# Patient Record
Sex: FEMALE | HISPANIC OR LATINO | Employment: FULL TIME | URBAN - METROPOLITAN AREA
[De-identification: names, ages, dates, MRNs, and addresses within clinical notes are randomized per-mention and may not be internally consistent; named-entity substitution may affect disease eponyms.]

---
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
## 2022-01-11 NOTE — PROGRESS NOTES
"  [facility]     Date of visit: 1/13/2022      Assessment:   Alpa Whitney is a 21 year old female with a past medical history significant for TBI and depression who presents with complaints of widespread pain.      1. Widespread pain- etiology likely fibromyalgia.  2. Mental Health - the patient's mental health concerns, specifically situational depression, affect her experience of pain and contribute to her clinically significant distress.     Visit Diagnoses:  1. Myofascial pain    2. Fibromyalgia        Plan:     1.  Pain Physical Therapy:                YES  It sounds like physical therapy is going well, Aviva is increasing her physical activity level and strength. I encouraged she continue visits with Shaunna Mendoza PT when able.               2.  Pain Psychologist to address relaxation, behavioral change, coping style, and other factors important to improvement.               NO  Continue to work with Marilee with [facility] on a regular basis.               3.  Medication Management:                           1. Addition of amitriptyline has been helpful for sleep and possibly for pain. We will increase further. Increase amitriptyline to 30mg at bedtime, monitor benefit. We can increase further as needed.               4.  Potential procedures: trigger point injections completed today. Excellent benefit afterwards, \"I feel like a ton of bricks are off my shoulder.\"               5.  Follow up with SUMIT Villeda CNP in 4-6 weeks.       Christiana ARANA CNP  [facility]     -------------------------------------------------    Subjective:    Chief complaint:   Chief Complaint   Patient presents with     Pain       Interval history:  Alpa Whitney is a 21 year old female last seen on 12/2/2021.  she is seen in follow up.     Recommendations/plan at the last visit included:   1.  Pain Physical Therapy:                " "YES  Pain physical therapy was somewhat helpful but she was only able to have two sessions. She will start physical therapy with Shaunna Mendoza PT when able. I recommended she have visits on a regular basis.               2.  Pain Psychologist to address relaxation, behavioral change, coping style, and other factors important to improvement.               NO  Continue to work with Marilee with [facility] on a regular basis.               3.  Medication Management:                           1. Unfortunately Aviva reports no pain benefit from Cymbalta 40mg after 6-8 weeks at this dose. She would like to taper off. She will decrease Cymbalta to 20mg daily for 7 days, then stop.    2. Sleep is a current significant problem. We discussed the benefits of amitriptyline and she is interested in starting. Start amitriptyline 10mg at bedtime. After 10 days, can increase to 20mg at bedtime. Monitor pain and sleep benefit.               4.  Potential procedures: significant myofascial pain. Trigger point injections ordered today. They will call to schedule.               5.  Follow up with SUMIT Villeda CNP 2 weeks after injections.     Since her last visit, Alpa DOMI Whitney reports:  -Her pain is better than it was at last visit.   -She attributes this to be due to amitriptyline and physical therapy.   -She started amitriptyline at bedtime, has been taking 20mg for several weeks. She states she has been falling asleep and staying asleep much better, \"I feel refreshed.\" She also thinks it has helped with pain some as well. She interested in dose increase.   -She tapered off of Cymbalta without any issue.   -She had two visits with Shaunna Flor PT. States, \"I think it will be helpful, its helping me be stronger.\" She is able to practice exercises and stretches at home, is able to walk longer. She cancelled an appointment because of a GI bug.   -She didn't have trigger point injections as " "she had work that day and couldn't get it off. She is still interested.     Pain Information:   Pain rating: averages 5/10 on a 0-10 scale.      Current pain medications:    Amitriptyline 20mg at bedtime- [facility] for pain and sleep   Ibuprofen 200mg prn- [facility], [place], takes for severe pain    Current MME: 0    Review of [place] Prescription Monitoring Program (): No concern for abuse or misuse of controlled medications based on this report.     Annual Controlled Substance Agreement/UDS due date: NA    Past pain treatments:  1. Previous Pain Relevant Medications:              Opiates: Tylenol with codeine- [facility]              NSAIDS: ibuprofen- [facility], Toradol- ?              Muscle Relaxants: Flexeril- [facility], , sedating, took at bedtime, Robaxin- ?, [place], sedating              Anti-migraine mediations: no              Anti-depressants:  Zoloft- [facility], \"I didn't want to keep going up,\" Cymbalta (40mg 6-8 weeks)- [facility]               Sleep aids:no              Anxiolytics: no              Neuropathics: no                       Topicals: no              Other medications not covered above: Tylenol- [facility]     2. Physical Therapy:   pain physical therapy x2 sessions- [facility]  concussion physical therapy- [facility]  3. Pain Psychology:  has a therapist- Marilee with [facility]  4. Surgery: no  5. Injections: no  6. Chiropractic: yes on a regular basis- [facility]  7. Acupuncture: no  8. TENS Unit: no       Medications:  Current Outpatient Medications   Medication Sig Dispense Refill     amitriptyline (ELAVIL) 10 MG tablet Take 3 tablets (30 mg) by mouth At Bedtime 90 tablet 1     cetirizine (ZYRTEC) 10 MG tablet Take 1 tablet (10 mg) by mouth daily 100 tablet 11     ergocalciferol (VITAMIN D2) 400 units (10 mcg) TABS tablet Take 2 tablets (800 Units) by mouth daily 60 tablet 3     etonogestrel (NEXPLANON) 68 MG IMPL 1 each (68 mg) by Subdermal route once       ibuprofen (ADVIL/MOTRIN) 200 MG capsule Take 200 mg by mouth every 4 hours as needed for fever  "      potassium aminobenzoate 500 MG CAPS capsule          Medical History: any changes in medical history since they were last seen? No    Review of Systems: A 10-point review of systems was negative, with the exception of chronic pain issues.      Objective:    Physical Exam:  Blood pressure 117/81, pulse 82, SpO2 99 %, not currently breastfeeding.  Constitutional: Well developed, well nourished, appears stated age.  Gait: Normal  HEENT: Head atraumatic, normocephalic. Eyes without conjunctival injection or jaundice. Oropharynx clear. Neck supple. No obvious neck masses.  Skin: No rash, lesions, or petechiae of exposed skin.   Psychiatric/mental status: Alert, without lethargy or stupor. Speech fluent. Appropriate affect. Mood normal. Able to follow commands without difficulty.     [facility] - Procedure Note    Date of Visit: 1/13/2022    Pre procedure Diagnosis: myofascial pain/myositis 60.9   Post procedure Diagnosis: Same  Procedure performed: trigger point injections  Complications: none  Operators: Christiana ARANA CNP    /81   Pulse 82   SpO2 99%     Indications:   Alpa Whitney is a 21 year old female with a history of neck and shoulder pain.  Exam shows myofascial pain of the muscle groups listed below and they have tried conservative treatment including PT and medications.    Options/alternatives, benefits and risks were discussed with the patient including bleeding, infection, tissue trauma and pnuemothorax.  Questions were answered to her satisfaction and she agrees to proceed. Voluntary informed consent was obtained and signed.     Vitals allergies and medications were reviewed.    This is a first time trigger point injection.     Procedure:  After getting informed consent, a Pause for the Cause was performed.    Trigger points were identified by patient, and marked when appropriate.  The area was prepped with Chloroprep.    Using clean technique,  injections were completed using a 25G, 1.5 inch needle.  After negative aspiration, injection was completed.  A total of 5 locations were injected.  When possible, tissue was retracted from the chest wall to avoid lung injury.    Muscle groups injected:  Bilateral trapezius and left splenius capitis, levator scapulae.      Injection solution contained:  9ml of 0.5% bupivacaine.    Hemostasis was achieved, the area was cleaned, and bandaids were placed when appropriate.  The patient tolerated the procedure well.  Respirations were equal and unlabored.       Follow-up includes:   -f/u with the referring provider  -repeat as needed      Christiana ARANA [facility]       BILLING TIME DOCUMENTATION:   The total TIME spent on this patient on the date of the encounter/appointment was 20 minutes.      TOTAL TIME includes:   Time spent preparing to see the patient (reviewing records and tests)   Time spent face to face (or over the phone) with the patient   Time spent ordering tests, medications, procedures and referrals   Time spent Referring and communicating with other healthcare professionals   Time spent documenting clinical information in Epic

## 2022-01-13 ENCOUNTER — OFFICE VISIT (OUTPATIENT)
Dept: PALLIATIVE MEDICINE | Facility: CLINIC | Age: 22
End: 2022-01-13
Payer: COMMERCIAL

## 2022-01-13 VITALS — HEART RATE: 82 BPM | DIASTOLIC BLOOD PRESSURE: 81 MMHG | SYSTOLIC BLOOD PRESSURE: 117 MMHG | OXYGEN SATURATION: 99 %

## 2022-01-13 DIAGNOSIS — M79.7 FIBROMYALGIA: ICD-10-CM

## 2022-01-13 DIAGNOSIS — M79.18 MYOFASCIAL PAIN: Primary | ICD-10-CM

## 2022-01-13 PROCEDURE — 20553 NJX 1/MLT TRIGGER POINTS 3/>: CPT | Performed by: NURSE PRACTITIONER

## 2022-01-13 PROCEDURE — 99213 OFFICE O/P EST LOW 20 MIN: CPT | Mod: 25 | Performed by: NURSE PRACTITIONER

## 2022-01-13 RX ORDER — BUPIVACAINE HYDROCHLORIDE 5 MG/ML
9 INJECTION, SOLUTION EPIDURAL; INTRACAUDAL ONCE
Status: COMPLETED | OUTPATIENT
Start: 2022-01-13 | End: 2022-01-13

## 2022-01-13 RX ORDER — AMITRIPTYLINE HYDROCHLORIDE 10 MG/1
30 TABLET ORAL AT BEDTIME
Qty: 90 TABLET | Refills: 1 | Status: SHIPPED | OUTPATIENT
Start: 2022-01-13 | End: 2022-04-01

## 2022-01-13 RX ADMIN — BUPIVACAINE HYDROCHLORIDE 45 MG: 5 INJECTION, SOLUTION EPIDURAL; INTRACAUDAL at 11:31

## 2022-01-13 ASSESSMENT — PAIN SCALES - GENERAL: PAINLEVEL: MODERATE PAIN (5)

## 2022-01-13 NOTE — PATIENT INSTRUCTIONS
1.  Pain Physical Therapy:                YES  Continue visits with Shaunna Mendoza PT when able.               2.  Pain Psychologist to address relaxation, behavioral change, coping style, and other factors important to improvement.               NO  Continue to work with Marilee with [facility] on a regular basis.               3.  Medication Management:                           1. Increase amitriptyline to 30mg at bedtime, monitor benefit. We can increase further as needed.               4.  Potential procedures: trigger point injections completed today.               5.  Follow up with SUMIT Villeda CNP in 4-6 weeks.     ----------------------------------------------------------------  Clinic Number:  [phone]     Call with any questions about your care and for scheduling assistance.     Calls are returned Monday through Friday between 8 AM and 4:30 PM. We usually get back to you within 2 business days depending on the issue/request.    If we are prescribing your medications:    For opioid medication refills, call the clinic or send a [employer] message 7 days in advance.  Please include:    Name of requested medication    Name of the pharmacy.    For non-opioid medications, call your pharmacy directly to request a refill. Please allow 3-4 days to be processed.     Per [place] State Law:    All controlled substance prescriptions must be filled within 30 days of being written.      For those controlled substances allowing refills, pickup must occur within 30 days of last fill.      We believe regular attendance is key to your success in our program!      Any time you are unable to keep your appointment we ask that you call us at least 24 hours in advance to cancel.This will allow us to offer the appointment time to another patient.     Multiple missed appointments may lead to dismissal from the clinic.    [facility]  Post Procedure Instructions    Today you had:   trigger point injections         Medications used:  lidocaine   bupivicaine   kenolog   dexamethasone          Go to the emergency room if you develop any shortness of breath    Monitor the injection sites for signs and symptoms of infection-fever, chills, redness, swelling, warmth, or drainage to areas.    You may have soreness at injection sites for up to 24 hours.    You may apply ice to the painful areas to help minimize the discomfort of the needle pokes.    Do not apply heat to sites for at least 12 hours.    You may use anti-inflammatory medications or Tylenol for pain control if necessary    Pain Clinic phone number during work hours (Monday through Friday 8 am-4:30 pm) at [phone] or the Provider Line after hours at [phone]:

## 2022-02-22 NOTE — PROGRESS NOTES
[facility]    Outpatient Occupational Therapy Discharge Note  Patient: Alpa Whitney  : 2000    Beginning/End Dates of Reporting Period:  20 to 12/10/20    Referring Provider: Linda Galvin MD     Therapy Diagnosis: Cervicalgia M54.2  - Primary; Post-concussion headache G44.309     Client Self Report: Pt c/o significant fatigue and headache today. Has not been sleeping well since her concussion and headaches have been constant. Is not currently working due to COVID and has not been completing school work due to concussion symptoms. Forgot to track symptoms between eval and today's visit.    Objective Measurements:   N/A      Goals:     Goal Identifier symptom management   Goal Description Patient will identify and independently demonstrate 3 strategies (lifestyle changes, EC, etc.) to decrease her post-concussion symptoms for increased independence during ADL/IADLs (school, work and home tasks), and report a score of 15 or less on 2 consecutive visits on the concussion symptom assessment score.   Target Date 21   Date Met      Progress (detail required for progress note): Goal not met due to limited number of treatment sessions.  Thus far education was completed in: sleep hygiene     Goal Identifier visual skills   Goal Description Patient will identify and utilize 3 strategies and/or AE re: vision to decrease her light sensitivity and increase her peripersonal and extrapersonal scanning speed, accuracy and tolerance for increased independence during ADL/IADLs   Target Date 21   Date Met      Progress (detail required for progress note): Goal not met due to limited number of treatment sessions.  Thus far education was completed in: filters (light and extra dark plum, standard size) - issued resources.     Goal Identifier memory skills   Goal Description Patient  will demonstrate improved memory skills by completing moderately complex short-term memory tasks in occupational therapy with 85% accuracy using compensatory strategies for increased safety and independence with ADL/IADLS at home, work and in the community.   Target Date 02/11/21   Date Met      Progress (detail required for progress note): Goal not addressed due to limited number of treatment sessions.     Plan:  Discharge from therapy.    Discharge:    Reason for Discharge: Patient has failed to schedule further appointments.  She only showed for 1 visit after the evaluation despite recommendations to continue therapy.    Equipment Issued: N/A    Discharge Plan: Patient to continue home program.

## 2022-04-01 DIAGNOSIS — T78.40XA ALLERGY, INITIAL ENCOUNTER: ICD-10-CM

## 2022-04-01 DIAGNOSIS — M79.7 FIBROMYALGIA: ICD-10-CM

## 2022-04-01 RX ORDER — AMITRIPTYLINE HYDROCHLORIDE 10 MG/1
30 TABLET ORAL AT BEDTIME
Qty: 90 TABLET | Refills: 1 | Status: SHIPPED | OUTPATIENT
Start: 2022-04-01 | End: 2022-08-24

## 2022-04-01 NOTE — TELEPHONE ENCOUNTER
Received fax request from [facility] requesting refill(s) for amitriptyline (ELAVIL) 10 MG tablet    Last refilled on 02/27/22    Pt last seen on 01/13/22  Next appt scheduled for : none    Will facilitate refill.

## 2022-04-01 NOTE — TELEPHONE ENCOUNTER
Routing refill request to provider for review/approval because:  Patient needs to be seen because:  Due for depression visit    Last office visit was 8/27/21     Myc message sent to patient advising scheduling an appointment. Was scheduled with a pediatrician on 2/22/22 and had that appointment canceled.  Has not rescheduled yet.

## 2022-04-03 RX ORDER — CETIRIZINE HYDROCHLORIDE 10 MG/1
TABLET ORAL
Qty: 30 TABLET | Refills: 39 | Status: SHIPPED | OUTPATIENT
Start: 2022-04-03

## 2022-06-04 NOTE — PROGRESS NOTES
Alpa Whitney was seen 2 times for evaluation and treatment.  Patient did not return for further treatment and current status is unknown.  Due to short treatment duration, no objective or functional changes were made.  Please see goal flow sheet from episode noted date below and initial evaluation for further information.    No linked episodes

## 2022-08-24 ENCOUNTER — VIRTUAL VISIT (OUTPATIENT)
Dept: INTERNAL MEDICINE | Facility: CLINIC | Age: 22
End: 2022-08-24
Payer: COMMERCIAL

## 2022-08-24 DIAGNOSIS — J06.9 UPPER RESPIRATORY TRACT INFECTION, UNSPECIFIED TYPE: Primary | ICD-10-CM

## 2022-08-24 PROCEDURE — 99213 OFFICE O/P EST LOW 20 MIN: CPT | Mod: 95 | Performed by: INTERNAL MEDICINE

## 2022-08-24 RX ORDER — AZITHROMYCIN 250 MG/1
TABLET, FILM COATED ORAL
Qty: 6 TABLET | Refills: 0 | Status: SHIPPED | OUTPATIENT
Start: 2022-08-24 | End: 2022-11-15

## 2022-08-24 NOTE — PROGRESS NOTES
Aviva is a 22 year old who is being evaluated via a billable video visit.      How would you like to obtain your AVS? MyChart  If the video visit is dropped, the invitation should be resent by: Text to cell phone: [phone]  Will anyone else be joining your video visit? No        Assessment & Plan     Upper respiratory tract infection, unspecified type  Likely viral URI.  Discussed various strategies for symptom control.  Wrote prescription for Zithromax, to be taken starting on Saturday if symptoms have not improved.  Patient requested release from work for the rest of this week, which seems reasonable.  - azithromycin (ZITHROMAX) 250 MG tablet; Two tablets first day, then one tablet daily for four days.             See Patient Instructions    Return in about 2 weeks (around 9/7/2022) for recheck, if symptoms fail to improve.    Jacques Jiang MD  [facility]        Ernst Chicas is a 22 year old, presenting for the following health issues:    Pharyngitis and Cough          Acute Illness    Onset/Duration: 5 days   Symptoms:  Fever: YES  Chills/Sweats: No  Headache (location?): YES  Sinus Pressure: No  Conjunctivitis:  No  Ear Pain: no  Rhinorrhea: YES  Congestion: YES  Sore Throat: YES  Cough: YES  Wheeze: No  Decreased Appetite: No  Nausea: YES  Vomiting: No  Diarrhea: No  Dysuria/Freq.: No  Dysuria or Hematuria: No  Fatigue/Achiness: YES  Sick/Strep Exposure: No  Therapies tried and outcome: cough syrup         Review of Systems   Constitutional, HEENT, cardiovascular, pulmonary, gi and gu systems are negative, except as otherwise noted.      Objective    Vitals - Patient Reported  Weight (Patient Reported): 52.2 kg (115 lb)      Vitals:  No vitals were obtained today due to virtual visit.    Physical Exam   GENERAL: Healthy, alert and no distress  EYES: Eyes grossly normal to inspection.  No discharge or erythema, or obvious scleral/conjunctival  abnormalities.  RESP: No audible wheeze, occasional coughing, sinus congestion  SKIN: Visible skin clear. No significant rash, abnormal pigmentation or lesions.  NEURO: Cranial nerves grossly intact.  Mentation and speech appropriate for age.  PSYCH: Mentation appears normal, affect normal/bright, judgement and insight intact, normal speech and appearance well-groomed.                Video-Visit Details    Video Start Time: 5:31 PM    Type of service:  Video Visit    Video End Time:5:36 PM    Originating Location (pt. Location): Home    Distant Location (provider location):  [facility]     Platform used for Video Visit: [employer]    .  Reyes.

## 2022-11-11 ASSESSMENT — ENCOUNTER SYMPTOMS
BREAST MASS: 0
CHILLS: 1
NAUSEA: 1
FEVER: 1
DIZZINESS: 1
PALPITATIONS: 0
HEMATOCHEZIA: 0
ARTHRALGIAS: 1
MYALGIAS: 1
JOINT SWELLING: 0
CONSTIPATION: 0
ABDOMINAL PAIN: 0
SORE THROAT: 1
EYE PAIN: 0
NERVOUS/ANXIOUS: 1
DIARRHEA: 0
DYSURIA: 0
PARESTHESIAS: 0
HEARTBURN: 0
FREQUENCY: 1
HEADACHES: 1
WEAKNESS: 1
SHORTNESS OF BREATH: 0
HEMATURIA: 0

## 2022-11-15 ENCOUNTER — OFFICE VISIT (OUTPATIENT)
Dept: FAMILY MEDICINE | Facility: CLINIC | Age: 22
End: 2022-11-15
Payer: COMMERCIAL

## 2022-11-15 VITALS
WEIGHT: 117.8 LBS | HEART RATE: 106 BPM | SYSTOLIC BLOOD PRESSURE: 112 MMHG | RESPIRATION RATE: 18 BRPM | TEMPERATURE: 98 F | OXYGEN SATURATION: 98 % | DIASTOLIC BLOOD PRESSURE: 64 MMHG | BODY MASS INDEX: 23.01 KG/M2

## 2022-11-15 DIAGNOSIS — Z11.59 NEED FOR HEPATITIS C SCREENING TEST: ICD-10-CM

## 2022-11-15 DIAGNOSIS — Z11.4 SCREENING FOR HIV (HUMAN IMMUNODEFICIENCY VIRUS): ICD-10-CM

## 2022-11-15 DIAGNOSIS — Z23 NEED FOR VACCINATION: ICD-10-CM

## 2022-11-15 DIAGNOSIS — Z00.00 ROUTINE GENERAL MEDICAL EXAMINATION AT A HEALTH CARE FACILITY: Primary | ICD-10-CM

## 2022-11-15 DIAGNOSIS — Z11.3 SCREENING FOR STDS (SEXUALLY TRANSMITTED DISEASES): ICD-10-CM

## 2022-11-15 LAB
BASOPHILS # BLD AUTO: 0 10E3/UL (ref 0–0.2)
BASOPHILS NFR BLD AUTO: 0 %
DEPRECATED CALCIDIOL+CALCIFEROL SERPL-MC: 11 UG/L (ref 20–75)
EOSINOPHIL # BLD AUTO: 0.1 10E3/UL (ref 0–0.7)
EOSINOPHIL NFR BLD AUTO: 1 %
ERYTHROCYTE [DISTWIDTH] IN BLOOD BY AUTOMATED COUNT: 14 % (ref 10–15)
HCT VFR BLD AUTO: 42.4 % (ref 35–47)
HCV AB SERPL QL IA: NONREACTIVE
HGB BLD-MCNC: 13.8 G/DL (ref 11.7–15.7)
HIV 1+2 AB+HIV1 P24 AG SERPL QL IA: NONREACTIVE
LYMPHOCYTES # BLD AUTO: 1.8 10E3/UL (ref 0.8–5.3)
LYMPHOCYTES NFR BLD AUTO: 17 %
MCH RBC QN AUTO: 29 PG (ref 26.5–33)
MCHC RBC AUTO-ENTMCNC: 32.5 G/DL (ref 31.5–36.5)
MCV RBC AUTO: 89 FL (ref 78–100)
MONOCYTES # BLD AUTO: 0.5 10E3/UL (ref 0–1.3)
MONOCYTES NFR BLD AUTO: 5 %
NEUTROPHILS # BLD AUTO: 8.2 10E3/UL (ref 1.6–8.3)
NEUTROPHILS NFR BLD AUTO: 77 %
PLATELET # BLD AUTO: 301 10E3/UL (ref 150–450)
RBC # BLD AUTO: 4.76 10E6/UL (ref 3.8–5.2)
WBC # BLD AUTO: 10.6 10E3/UL (ref 4–11)

## 2022-11-15 PROCEDURE — 87491 CHLMYD TRACH DNA AMP PROBE: CPT | Performed by: STUDENT IN AN ORGANIZED HEALTH CARE EDUCATION/TRAINING PROGRAM

## 2022-11-15 PROCEDURE — 86803 HEPATITIS C AB TEST: CPT | Performed by: STUDENT IN AN ORGANIZED HEALTH CARE EDUCATION/TRAINING PROGRAM

## 2022-11-15 PROCEDURE — 99395 PREV VISIT EST AGE 18-39: CPT | Mod: 25 | Performed by: STUDENT IN AN ORGANIZED HEALTH CARE EDUCATION/TRAINING PROGRAM

## 2022-11-15 PROCEDURE — 0124A COVID-19,PF,PFIZER BOOSTER BIVALENT: CPT | Performed by: STUDENT IN AN ORGANIZED HEALTH CARE EDUCATION/TRAINING PROGRAM

## 2022-11-15 PROCEDURE — 91312 COVID-19,PF,PFIZER BOOSTER BIVALENT: CPT | Performed by: STUDENT IN AN ORGANIZED HEALTH CARE EDUCATION/TRAINING PROGRAM

## 2022-11-15 PROCEDURE — 87591 N.GONORRHOEAE DNA AMP PROB: CPT | Performed by: STUDENT IN AN ORGANIZED HEALTH CARE EDUCATION/TRAINING PROGRAM

## 2022-11-15 PROCEDURE — 90471 IMMUNIZATION ADMIN: CPT | Performed by: STUDENT IN AN ORGANIZED HEALTH CARE EDUCATION/TRAINING PROGRAM

## 2022-11-15 PROCEDURE — 87389 HIV-1 AG W/HIV-1&-2 AB AG IA: CPT | Performed by: STUDENT IN AN ORGANIZED HEALTH CARE EDUCATION/TRAINING PROGRAM

## 2022-11-15 PROCEDURE — 36415 COLL VENOUS BLD VENIPUNCTURE: CPT | Performed by: STUDENT IN AN ORGANIZED HEALTH CARE EDUCATION/TRAINING PROGRAM

## 2022-11-15 PROCEDURE — 82306 VITAMIN D 25 HYDROXY: CPT | Performed by: STUDENT IN AN ORGANIZED HEALTH CARE EDUCATION/TRAINING PROGRAM

## 2022-11-15 PROCEDURE — 90686 IIV4 VACC NO PRSV 0.5 ML IM: CPT | Performed by: STUDENT IN AN ORGANIZED HEALTH CARE EDUCATION/TRAINING PROGRAM

## 2022-11-15 PROCEDURE — 85025 COMPLETE CBC W/AUTO DIFF WBC: CPT | Performed by: STUDENT IN AN ORGANIZED HEALTH CARE EDUCATION/TRAINING PROGRAM

## 2022-11-15 ASSESSMENT — ENCOUNTER SYMPTOMS
SORE THROAT: 1
DIARRHEA: 0
FREQUENCY: 1
HEMATURIA: 0
ARTHRALGIAS: 1
CHILLS: 1
HEADACHES: 1
JOINT SWELLING: 0
WEAKNESS: 1
PALPITATIONS: 0
EYE PAIN: 0
SHORTNESS OF BREATH: 0
HEMATOCHEZIA: 0
NERVOUS/ANXIOUS: 1
BREAST MASS: 0
MYALGIAS: 1
ABDOMINAL PAIN: 0
DIZZINESS: 1
HEARTBURN: 0
NAUSEA: 1
CONSTIPATION: 0
FEVER: 1
PARESTHESIAS: 0
DYSURIA: 0

## 2022-11-15 ASSESSMENT — PATIENT HEALTH QUESTIONNAIRE - PHQ9
SUM OF ALL RESPONSES TO PHQ QUESTIONS 1-9: 7
10. IF YOU CHECKED OFF ANY PROBLEMS, HOW DIFFICULT HAVE THESE PROBLEMS MADE IT FOR YOU TO DO YOUR WORK, TAKE CARE OF THINGS AT HOME, OR GET ALONG WITH OTHER PEOPLE: SOMEWHAT DIFFICULT
SUM OF ALL RESPONSES TO PHQ QUESTIONS 1-9: 7

## 2022-11-15 NOTE — PROGRESS NOTES
SUBJECTIVE:   CC: Aviva is an 22 year old who presents for preventive health visit.       Patient has been advised of split billing requirements and indicates understanding: Yes  Healthy Habits:     Getting at least 3 servings of Calcium per day:  Yes    Bi-annual eye exam:  NO    Dental care twice a year:  Yes    Sleep apnea or symptoms of sleep apnea:  None    Diet:  Regular (no restrictions)    Frequency of exercise:  2-3 days/week    Duration of exercise:  15-30 minutes    Taking medications regularly:  Yes    Medication side effects:  Lightheadedness    PHQ-2 Total Score: 1    Additional concerns today:  Yes              Today's PHQ-2 Score:   PHQ-2 ( 1999 Pfizer) 11/11/2022   Q1: Little interest or pleasure in doing things 1   Q2: Feeling down, depressed or hopeless 0   PHQ-2 Score 1   PHQ-2 Total Score (12-17 Years)- Positive if 3 or more points; Administer PHQ-A if positive -   Q1: Little interest or pleasure in doing things Several days   Q2: Feeling down, depressed or hopeless Not at all   PHQ-2 Score 1       Abuse: Current or Past (Physical, Sexual or Emotional) - No  Do you feel safe in your environment? Yes        Social History     Tobacco Use     Smoking status: Never     Smokeless tobacco: Never   Substance Use Topics     Alcohol use: No     Alcohol/week: 0.0 standard drinks         Alcohol Use 11/11/2022   Prescreen: >3 drinks/day or >7 drinks/week? No       Reviewed orders with patient.  Reviewed health maintenance and updated orders accordingly - Yes  Lab work is in process  Labs reviewed in EPIC    Breast Cancer Screening:        History of abnormal Pap smear: NO - under age 21, PAP not appropriate for age     Reviewed and updated as needed this visit by clinical staff   Tobacco  Allergies  Meds   Med Hx  Surg Hx  Fam Hx  Soc Hx        Reviewed and updated as needed this visit by Provider                     Review of Systems   Constitutional: Positive for chills and fever.   HENT:  Positive for congestion, ear pain and sore throat. Negative for hearing loss.    Eyes: Positive for visual disturbance. Negative for pain.   Respiratory: Negative for shortness of breath.    Cardiovascular: Negative for chest pain, palpitations and peripheral edema.   Gastrointestinal: Positive for nausea. Negative for abdominal pain, constipation, diarrhea, heartburn and hematochezia.   Breasts:  Negative for tenderness, breast mass and discharge.   Genitourinary: Positive for frequency. Negative for dysuria, genital sores, hematuria, pelvic pain, urgency, vaginal bleeding and vaginal discharge.   Musculoskeletal: Positive for arthralgias and myalgias. Negative for joint swelling.   Skin: Negative for rash.   Neurological: Positive for dizziness, weakness and headaches. Negative for paresthesias.   Psychiatric/Behavioral: The patient is nervous/anxious.           OBJECTIVE:   /64   Pulse 106   Temp 98  F (36.7  C) (Oral)   Resp 18   Wt 53.4 kg (117 lb 12.8 oz)   SpO2 98%   BMI 23.01 kg/m    Physical Exam  GENERAL: healthy, alert and no distress  EYES: Eyes grossly normal to inspection, PERRL and conjunctivae and sclerae normal  HENT: ear canals and TM's normal, nose and mouth without ulcers or lesions  NECK: no adenopathy, no asymmetry, masses, or scars and thyroid normal to palpation  RESP: lungs clear to auscultation - no rales, rhonchi or wheezes  CV: regular rate and rhythm, normal S1 S2, no S3 or S4, no murmur, click or rub, no peripheral edema and peripheral pulses strong  ABDOMEN: soft, nontender, no hepatosplenomegaly, no masses and bowel sounds normal  MS: no gross musculoskeletal defects noted, no edema  PSYCH: mentation appears normal, affect normal/bright    Diagnostic Test Results:  Labs reviewed in Epic  Results for orders placed or performed in visit on 11/15/22   CBC with platelets and differential     Status: None (In process)    Narrative    The following orders were created for panel  order CBC with platelets and differential.  Procedure                               Abnormality         Status                     ---------                               -----------         ------                     CBC with platelets and d...[[id number]]                      In process                   Please view results for these tests on the individual orders.       ASSESSMENT/PLAN:   (Z00.00) Routine general medical examination at a health care facility  (primary encounter diagnosis)  Comment: Stable   Plan: REVIEW OF HEALTH MAINTENANCE PROTOCOL ORDERS,         Vitamin D Deficiency, CBC with platelets and         differential        Pending labs      (Z11.4) Screening for HIV (human immunodeficiency virus)  Comment: Stable  Plan: HIV Antigen Antibody Combo        Pending labs      (Z11.59) Need for hepatitis C screening test  Comment: Stable  Plan: Hepatitis C Screen Reflex to HCV RNA Quant and         Genotype        Pending labs      (Z11.3) Screening for STDs (sexually transmitted diseases)  Comment: Stable  Plan: NEISSERIA GONORRHOEA PCR, CHLAMYDIA TRACHOMATIS        PCR        Pending labs      (Z23) Need for vaccination  Comment: Stable  Plan: INFLUENZA VACCINE IM > 6 MONTHS VALENT IIV4         (AFLURIA/FLUZONE), COVID-19,PF,PFIZER BOOSTER         BIVALENT (12+YRS)        Pt received Covid booster and flu shot today vaccine(s) today in clinic        Patient has been advised of split billing requirements and indicates understanding: Yes      COUNSELING:  Reviewed preventive health counseling, as reflected in patient instructions       Regular exercise       Healthy diet/nutrition       Immunizations    Vaccinated for: Influenza +COVID-19 booster              Advance Care Planning    Estimated body mass index is 23.01 kg/m  as calculated from the following:    Height as of 8/27/21: 1.524 m (5').    Weight as of this encounter: 53.4 kg (117 lb 12.8 oz).        She reports that she has never smoked. She has  never used smokeless tobacco.      Counseling Resources:  ATP IV Guidelines  Pooled Cohorts Equation Calculator  Breast Cancer Risk Calculator  BRCA-Related Cancer Risk Assessment: FHS-7 Tool  FRAX Risk Assessment  ICSI Preventive Guidelines  Dietary Guidelines for Americans, 2010  USDA's MyPlate  ASA Prophylaxis  Lung CA Screening    MABEL DOTSON MD  [facility]  Answers for HPI/ROS submitted by the patient on 11/15/2022  If you checked off any problems, how difficult have these problems made it for you to do your work, take care of things at home, or get along with other people?: Somewhat difficult  PHQ9 TOTAL SCORE: 7

## 2022-11-15 NOTE — PATIENT INSTRUCTIONS
Thank you for choosing [facility] :)    Have a wonderful day     -Dr. Yessenia Barnes     Preventive Health Recommendations  Female Ages 18 to 20     Yearly exam:   See your health care provider every year in order to  Review health changes.   Discuss preventive care.    Review your medicines if your doctor has prescribed any.    You should be tested each year for STDs (sexually transmitted diseases).     After age 20, talk to your provider about how often you should have cholesterol testing.    If you are at risk for diabetes, you should have a diabetes test (fasting glucose).     Shots:   Get a flu shot each year.   Get a tetanus shot every 10 years.   Consider getting the shot (vaccine) that prevents cervical cancer (Gardasil).    Nutrition:   Eat at least 5 servings of fruits and vegetables each day.  Eat whole-grain bread, whole-wheat pasta and brown rice instead of white grains and rice.  Get adequate Calcium and Vitamin D.     Lifestyle  Exercise at least 150 minutes a week each week (30 minutes a day, 5 days a week). This will help you control your weight and prevent disease.  No smoking.   Wear sunscreen to prevent skin cancer.  See your dentist every six months for an exam and cleaning.

## 2022-11-16 DIAGNOSIS — E55.9 VITAMIN D DEFICIENCY: Primary | ICD-10-CM

## 2022-11-16 LAB
C TRACH DNA SPEC QL NAA+PROBE: NEGATIVE
N GONORRHOEA DNA SPEC QL NAA+PROBE: NEGATIVE

## 2022-11-16 RX ORDER — ERGOCALCIFEROL 1.25 MG/1
50000 CAPSULE, LIQUID FILLED ORAL WEEKLY
Qty: 12 CAPSULE | Refills: 3 | Status: SHIPPED | OUTPATIENT
Start: 2022-11-16 | End: 2023-02-15

## 2023-01-24 ENCOUNTER — VIRTUAL VISIT (OUTPATIENT)
Dept: URGENT CARE | Facility: CLINIC | Age: 23
End: 2023-01-24
Payer: COMMERCIAL

## 2023-01-24 DIAGNOSIS — J02.9 ACUTE SORE THROAT: ICD-10-CM

## 2023-01-24 DIAGNOSIS — H10.33 ACUTE CONJUNCTIVITIS OF BOTH EYES, UNSPECIFIED ACUTE CONJUNCTIVITIS TYPE: Primary | ICD-10-CM

## 2023-01-24 PROCEDURE — 99213 OFFICE O/P EST LOW 20 MIN: CPT | Mod: 95

## 2023-01-24 RX ORDER — POLYMYXIN B SULFATE AND TRIMETHOPRIM 1; 10000 MG/ML; [USP'U]/ML
1-2 SOLUTION OPHTHALMIC EVERY 6 HOURS
Qty: 3 ML | Refills: 0 | Status: SHIPPED | OUTPATIENT
Start: 2023-01-24 | End: 2023-01-31

## 2023-01-24 NOTE — LETTER
January 24, 2023      Alpa Whitney  [address] [place] [facility] [address]        To Whom It May Concern:    Alpa Whitney  was seen on 1/24/23.  Please excuse her from work missed on January 24 and 25. 2023 due to illness.         Sincerely,    Yessenia Hakwins, CNP    [facility]

## 2023-01-24 NOTE — PATIENT INSTRUCTIONS
Make a lab only visit for your strep test. Once we have the results back we will contact you if treatment is indicated

## 2023-01-24 NOTE — PROGRESS NOTES
Aviva is a 22 year old who is being evaluated via a billable video visit.       Assessment & Plan     Acute conjunctivitis of both eyes, unspecified acute conjunctivitis type    - trimethoprim-polymyxin b (POLYTRIM) 90669-0.1 UNIT/ML-% ophthalmic solution; Place 1-2 drops into both eyes every 6 hours for 7 days    Acute sore throat    - Streptococcus A Rapid Screen w/Reflex to PCR; Future      15 minutes spent on the date of the encounter doing chart review, patient visit and documentation        See Patient Instructions    Return in about 1 week (around 1/31/2023), or if symptoms worsen or fail to improve.    Virtual Urgent Care  [facility]    Subjective   Aviva is a 22 year old presenting for the following health issues:  No chief complaint on file.      HPI     Acute Illness  Acute illness concerns: pink eye and sore throat  Onset/Duration: 1 week  Symptom:  Fever: No  Chills/Sweats: No  Headache (location?): No  Sinus Pressure: No  Conjunctivitis:  YES  Ear Pain: no  Rhinorrhea: No  Congestion: No  Sore Throat: YES  Cough: no  Wheeze: No  Decreased Appetite: YES  Nausea: YES  Vomiting: No  Diarrhea: No  Fatigue/Achiness: YES  Sick/Strep Exposure: No  Therapies tried and outcome: None  COVID test at home was negative    Review of Systems   Constitutional, HEENT, cardiovascular, pulmonary, gi and gu systems are negative, except as otherwise noted.      Objective           Vitals:  No vitals were obtained today due to virtual visit.    Physical Exam   GENERAL: Healthy, alert and no distress  RESP: No audible wheeze, cough, or visible cyanosis.  No visible retractions or increased work of breathing.              Video-Visit Details    Type of service:  Video Visit   Video Start Time: 1655  Video End Time:5:04 PM    Originating Location (pt. Location): Home  Distant Location (provider location):  Off-site  Platform used for Video Visit: [employer]

## 2023-01-25 ENCOUNTER — LAB (OUTPATIENT)
Dept: URGENT CARE | Facility: URGENT CARE | Age: 23
End: 2023-01-25
Attending: NURSE PRACTITIONER
Payer: COMMERCIAL

## 2023-01-25 DIAGNOSIS — J02.9 ACUTE SORE THROAT: ICD-10-CM

## 2023-01-25 DIAGNOSIS — J02.0 STREP THROAT: Primary | ICD-10-CM

## 2023-01-25 LAB
DEPRECATED S PYO AG THROAT QL EIA: NEGATIVE
GROUP A STREP BY PCR: DETECTED

## 2023-01-25 PROCEDURE — 87651 STREP A DNA AMP PROBE: CPT

## 2023-01-25 PROCEDURE — 99207 PR NO CHARGE LOS: CPT

## 2023-01-25 RX ORDER — AMOXICILLIN 500 MG/1
500 CAPSULE ORAL 2 TIMES DAILY
Qty: 20 CAPSULE | Refills: 0 | Status: SHIPPED | OUTPATIENT
Start: 2023-01-25 | End: 2023-02-04

## 2023-01-25 NOTE — PROGRESS NOTES
Strep test completed. Patient handout For Patients Who Have Been Tested for Covid-19 (Coronavirus) was given to the patient, which includes test result notification process.

## 2023-02-01 ENCOUNTER — E-VISIT (OUTPATIENT)
Dept: FAMILY MEDICINE | Facility: CLINIC | Age: 23
End: 2023-02-01
Payer: COMMERCIAL

## 2023-02-01 DIAGNOSIS — R51.9 ACUTE NONINTRACTABLE HEADACHE, UNSPECIFIED HEADACHE TYPE: Primary | ICD-10-CM

## 2023-02-01 PROCEDURE — 99421 OL DIG E/M SVC 5-10 MIN: CPT | Performed by: STUDENT IN AN ORGANIZED HEALTH CARE EDUCATION/TRAINING PROGRAM

## 2023-02-01 RX ORDER — ONDANSETRON 4 MG/1
4 TABLET, ORALLY DISINTEGRATING ORAL EVERY 6 HOURS PRN
Qty: 30 TABLET | Refills: 1 | Status: SHIPPED | OUTPATIENT
Start: 2023-02-01

## 2023-02-15 ENCOUNTER — OFFICE VISIT (OUTPATIENT)
Dept: FAMILY MEDICINE | Facility: CLINIC | Age: 23
End: 2023-02-15
Payer: COMMERCIAL

## 2023-02-15 VITALS
HEART RATE: 101 BPM | HEIGHT: 60 IN | TEMPERATURE: 97.9 F | DIASTOLIC BLOOD PRESSURE: 74 MMHG | BODY MASS INDEX: 23.44 KG/M2 | SYSTOLIC BLOOD PRESSURE: 108 MMHG | RESPIRATION RATE: 20 BRPM | WEIGHT: 119.4 LBS | OXYGEN SATURATION: 99 %

## 2023-02-15 DIAGNOSIS — E55.9 VITAMIN D DEFICIENCY: ICD-10-CM

## 2023-02-15 DIAGNOSIS — R07.0 THROAT PAIN: Primary | ICD-10-CM

## 2023-02-15 LAB
DEPRECATED S PYO AG THROAT QL EIA: NEGATIVE
GROUP A STREP BY PCR: NOT DETECTED

## 2023-02-15 PROCEDURE — 99214 OFFICE O/P EST MOD 30 MIN: CPT | Performed by: STUDENT IN AN ORGANIZED HEALTH CARE EDUCATION/TRAINING PROGRAM

## 2023-02-15 PROCEDURE — 87651 STREP A DNA AMP PROBE: CPT | Performed by: STUDENT IN AN ORGANIZED HEALTH CARE EDUCATION/TRAINING PROGRAM

## 2023-02-15 RX ORDER — ERGOCALCIFEROL 1.25 MG/1
50000 CAPSULE, LIQUID FILLED ORAL WEEKLY
Qty: 12 CAPSULE | Refills: 3 | Status: SHIPPED | OUTPATIENT
Start: 2023-02-15

## 2023-02-15 NOTE — LETTER
February 15, 2023      Alpa Whitney  [address] [place] [address]        To Whom It May Concern:    Alpa Whitney  was seen on 2/15/2023.  Please excuse her  until 2/202023 due to illness.        Sincerely,        MABEL DOTSON MD

## 2023-02-15 NOTE — PROGRESS NOTES
{PROVIDER CHARTING PREFERENCE:990983}    Ernst Chicas is a 23 year old accompanied by her none, presenting for the following health issues:  Throat Problem      History of Present Illness       Reason for visit:  My symptoms come back and go away    She eats 4 or more servings of fruits and vegetables daily.She consumes 4 sweetened beverage(s) daily.She exercises with enough effort to increase her heart rate 20 to 29 minutes per day.  She exercises with enough effort to increase her heart rate 3 or less days per week.   She is taking medications regularly.       Acute Illness  Acute illness concerns: Patient tested positive for strep on 1/25/23.   Onset/Duration: Reoccurring sore throat - 7 days   Symptoms:  Fever: No  Chills/Sweats: Yes- chills. No sweats.   Headache (location?): YES  Sinus Pressure: YES  Conjunctivitis:  No  Ear Pain: no  Rhinorrhea: No  Congestion: YES  Sore Throat: YES  Cough: no  Wheeze: YES  Decreased Appetite: YES  Nausea: YES  Vomiting: No  Diarrhea: No  Dysuria/Freq.: Yes- frequent urination.   Dysuria or Hematuria: No  Fatigue/Achiness: YES-both.   Sick/Strep Exposure: YES. Patient  Works at a .   Therapies tried and outcome: Patient finished full course of Amoxicillin.  {additonal problems for provider to add (Optional):530743}    Review of Systems   {ROS COMP (Optional):174685}      Objective    There were no vitals taken for this visit.  There is no height or weight on file to calculate BMI.  Physical Exam   {Exam List (Optional):724672}    {Diagnostic Test Results (Optional):549926}    {AMBULATORY ATTESTATION (Optional):446729}

## 2023-02-15 NOTE — PROGRESS NOTES
Assessment & Plan     (R07.0) Throat pain  (primary encounter diagnosis)  Comment: Recurrent unstable.  Patient with recent history of strep throat on January 25, 2023 and return to clinic with similar symptoms after completion of antibiotics.  Patient has had recurrent history of strep throat historically.  Patient to be evaluated by ENT due to recurrent history with possible consideration of tonsillectomy.  Will order strep test again today in clinic and treat if positive.  Plan: Streptococcus A Rapid Screen w/Reflex to PCR -         Clinic Collect, Adult ENT  Referral        Pending    (E55.9) Vitamin D deficiency  Comment: Chronic stable  Plan: vitamin D2 (ERGOCALCIFEROL) 06448 units (1250         mcg) capsule        Pending pickup                   No follow-ups on file.    MABEL DOTSON MD  [facility] SARA Chicas is a 23 year old accompanied by her none, presenting for the following health issues:  Throat Problem      History of Present Illness       Reason for visit:  My symptoms come back and go away    She eats 4 or more servings of fruits and vegetables daily.She consumes 4 sweetened beverage(s) daily.She exercises with enough effort to increase her heart rate 20 to 29 minutes per day.  She exercises with enough effort to increase her heart rate 3 or less days per week.   She is taking medications regularly.       Medication Followup of  VITAMIN D 50,000 units     Taking Medication as prescribed: yes    Side Effects:  None    Medication Helping Symptoms:  yes            Acute Illness  Acute illness concerns: Patient tested positive for strep on 1/25/23.   Onset/Duration: Reoccurring sore throat - 7 days   Symptoms:  Fever: No  Chills/Sweats: Yes- chills. No sweats.   Headache (location?): YES  Sinus Pressure: YES  Conjunctivitis:  No  Ear Pain: no  Rhinorrhea: No  Congestion: YES  Sore Throat: YES  Cough: no  Wheeze: YES  Decreased Appetite: YES  Nausea:  YES  Vomiting: No  Diarrhea: No  Dysuria/Freq.: Yes- frequent urination.   Dysuria or Hematuria: No  Fatigue/Achiness: YES-both.   Sick/Strep Exposure: YES. Patient  Works at a .   Therapies tried and outcome: Patient finished full course of Amoxicillin.              Review of Systems   CONSTITUTIONAL:POSITIVE  for chills  ENT/MOUTH: POSITIVE for hoarse voice, hoarseness and sore throat  RESP:POSITIVE for wheezing  CV: NEGATIVE for chest pain, palpitations or peripheral edema      Objective    /74   Pulse 101   Temp 97.9  F (36.6  C) (Oral)   Resp 20   Ht 1.524 m (5')   Wt 54.2 kg (119 lb 6.4 oz)   SpO2 99%   BMI 23.32 kg/m    Body mass index is 23.32 kg/m .  Physical Exam   GENERAL: alert and mild distress  EYES: Eyes grossly normal to inspection, PERRL and conjunctivae and sclerae normal  HENT: normal cephalic/atraumatic, ear canals and TM's normal, nose and mouth without ulcers or lesions, tonsillar hypertrophy and tonsillar erythema  NECK: no adenopathy, no asymmetry, masses, or scars and thyroid normal to palpation  MS: no gross musculoskeletal defects noted, no edema    No results found for any visits on 02/15/23.

## 2023-02-16 NOTE — TELEPHONE ENCOUNTER
FUTURE VISIT INFORMATION      FUTURE VISIT INFORMATION:    Date: 4/11/23    Time: 9:15am    Location: [facility]  REFERRAL INFORMATION:    Referring provider:  Yessenia Barnes MD     Referring providers clinic:  [facility]    Reason for visit/diagnosis  Ref by Yessenia Barnes MD for Throat pain/ Recurrent Strep throat infections    RECORDS REQUESTED FROM:       Clinic name Comments Records Status Imaging Status   [facility]  2/15/23- note with  Yessenia Barnes MD   1/24/23- note wit Yessenia Hawkins, CNP Epic

## 2023-02-18 NOTE — NURSING NOTE
18-Feb-2023 00:09 Prior to immunization administration, verified patients identity using patient s name and date of birth. Please see Immunization Activity for additional information.     Screening Questionnaire for Pediatric Immunization    Is the child sick today?   No   Does the child have allergies to medications, food, a vaccine component, or latex?   No   Has the child had a serious reaction to a vaccine in the past?   No   Does the child have a long-term health problem with lung, heart, kidney or metabolic disease (e.g., diabetes), asthma, a blood disorder, no spleen, complement component deficiency, a cochlear implant, or a spinal fluid leak?  Is he/she on long-term aspirin therapy?   No   If the child to be vaccinated is 2 through 4 years of age, has a healthcare provider told you that the child had wheezing or asthma in the  past 12 months?   No   If your child is a baby, have you ever been told he or she has had intussusception?   No   Has the child, sibling or parent had a seizure, has the child had brain or other nervous system problems?   No   Does the child have cancer, leukemia, AIDS, or any immune system         problem?   No   Does the child have a parent, brother, or sister with an immune system problem?   No   In the past 3 months, has the child taken medications that affect the immune system such as prednisone, other steroids, or anticancer drugs; drugs for the treatment of rheumatoid arthritis, Crohn s disease, or psoriasis; or had radiation treatments?   No   In the past year, has the child received a transfusion of blood or blood products, or been given immune (gamma) globulin or an antiviral drug?   No   Is the child/teen pregnant or is there a chance that she could become       pregnant during the next month?   No   Has the child received any vaccinations in the past 4 weeks?   No      Immunization questionnaire answers were all negative.        [place] given to patient.    Per  17-Feb-2023 16:40 orders of Dr. Sorenson, injection of Fluzone given by Meron Leach CMA. Patient instructed to remain in clinic for 15 minutes afterwards, and to report any adverse reaction to me immediately.    Screening performed by Meron Leach CMA on 9/23/2020 at 11:19 AM.

## 2023-03-23 ENCOUNTER — OFFICE VISIT (OUTPATIENT)
Dept: FAMILY MEDICINE | Facility: CLINIC | Age: 23
End: 2023-03-23
Payer: COMMERCIAL

## 2023-03-23 VITALS
WEIGHT: 119.2 LBS | SYSTOLIC BLOOD PRESSURE: 102 MMHG | TEMPERATURE: 98.1 F | DIASTOLIC BLOOD PRESSURE: 64 MMHG | HEIGHT: 60 IN | RESPIRATION RATE: 20 BRPM | BODY MASS INDEX: 23.4 KG/M2 | OXYGEN SATURATION: 98 % | HEART RATE: 108 BPM

## 2023-03-23 DIAGNOSIS — R07.0 THROAT PAIN: Primary | ICD-10-CM

## 2023-03-23 PROCEDURE — 99213 OFFICE O/P EST LOW 20 MIN: CPT | Performed by: STUDENT IN AN ORGANIZED HEALTH CARE EDUCATION/TRAINING PROGRAM

## 2023-03-23 ASSESSMENT — PATIENT HEALTH QUESTIONNAIRE - PHQ9
SUM OF ALL RESPONSES TO PHQ QUESTIONS 1-9: 15
10. IF YOU CHECKED OFF ANY PROBLEMS, HOW DIFFICULT HAVE THESE PROBLEMS MADE IT FOR YOU TO DO YOUR WORK, TAKE CARE OF THINGS AT HOME, OR GET ALONG WITH OTHER PEOPLE: SOMEWHAT DIFFICULT
SUM OF ALL RESPONSES TO PHQ QUESTIONS 1-9: 15

## 2023-03-23 NOTE — PROGRESS NOTES
Assessment & Plan     (R07.0) Throat pain  (primary encounter diagnosis)  Comment: Chronic,uncontrolled. Patient seen for URI in the ED and was prescribed antibiotics and a steroid. Pt completed treatment.   Plan: Pt pending being seen by ENT on April 11th for further evaluation and concerns with ear and chronic throat pain            MED REC REQUIRED  Post Medication Reconciliation Status:       MEDICATIONS:  Continue current medications without change    MABEL DOTSON MD  [facility] SARA Chicas is a 23 year old, presenting for the following health issues:  ED/FU  No flowsheet data found.  HPI     ED/UC Followup:    Facility:  [facility]   Date of visit: 3/1/23  Reason for visit: Upper respiratory tract infection   Current Status:    Acute Illness  Acute illness concerns: throat pain   Onset/Duration: Since January 2023 On and off.    Symptoms:  Fever: No  Chills/Sweats: YES.both.  Headache (location?): YES  Sinus Pressure: No  Conjunctivitis:  No  Ear Pain: YES: right ear while laying on right side.   Rhinorrhea: No  Congestion: No  Sore Throat: YES  Cough: YES- On and off.   Wheeze: YES- with physical actitivity   Decreased Appetite: YES  Nausea: YES  Vomiting: YES- twice per week.   Diarrhea: YES  Dysuria/Freq.: Frequent urination   Dysuria or Hematuria: No  Fatigue/Achiness: YES.both. Zachary has fibromyalgia.   Sick/Strep Exposure: YES- Patient works at a .   Therapies tried and outcome: Salt and water rinse, XL3  tablet , Ibuprofen, Nyquil,  Amoxicillin, Albuterol Inhaler, and  Prednisone.        Review of Systems   CONSTITUTIONAL: NEGATIVE for fever, chills, change in weight  ENT/MOUTH: POSITIVE for ear pain right and sore throat  RESP: NEGATIVE for significant cough or SOB  CV: NEGATIVE for chest pain, palpitations or peripheral edema      Objective    /64   Pulse 108   Temp 98.1  F (36.7  C) (Oral)   Resp 20   Ht 1.524 m (5')    Wt 54.1 kg (119 lb 3.2 oz)   SpO2 98%   BMI 23.28 kg/m    Body mass index is 23.28 kg/m .  Physical Exam   GENERAL: healthy, alert and no distress  EYES: Eyes grossly normal to inspection, PERRL and conjunctivae and sclerae normal  MS: no gross musculoskeletal defects noted, no edema  SKIN: no suspicious lesions or rashes  PSYCH: mentation appears normal, affect normal/bright    No results found for any visits on 03/23/23.                Answers for HPI/ROS submitted by the patient on 3/23/2023  If you checked off any problems, how difficult have these problems made it for you to do your work, take care of things at home, or get along with other people?: Somewhat difficult  PHQ9 TOTAL SCORE: 15

## 2023-04-11 ENCOUNTER — OFFICE VISIT (OUTPATIENT)
Dept: OTOLARYNGOLOGY | Facility: CLINIC | Age: 23
End: 2023-04-11
Payer: COMMERCIAL

## 2023-04-11 ENCOUNTER — PRE VISIT (OUTPATIENT)
Dept: OTOLARYNGOLOGY | Facility: CLINIC | Age: 23
End: 2023-04-11

## 2023-04-11 VITALS
WEIGHT: 120 LBS | SYSTOLIC BLOOD PRESSURE: 103 MMHG | DIASTOLIC BLOOD PRESSURE: 69 MMHG | HEART RATE: 72 BPM | BODY MASS INDEX: 23.44 KG/M2

## 2023-04-11 DIAGNOSIS — J02.0 STREP THROAT: Primary | ICD-10-CM

## 2023-04-11 DIAGNOSIS — J03.90 TONSILLITIS: ICD-10-CM

## 2023-04-11 PROCEDURE — 99000 SPECIMEN HANDLING OFFICE-LAB: CPT | Performed by: PATHOLOGY

## 2023-04-11 PROCEDURE — 99203 OFFICE O/P NEW LOW 30 MIN: CPT | Performed by: OTOLARYNGOLOGY

## 2023-04-11 PROCEDURE — 87081 CULTURE SCREEN ONLY: CPT | Mod: 90 | Performed by: PATHOLOGY

## 2023-04-11 NOTE — PATIENT INSTRUCTIONS
"You were seen in the clinic today by Dr. Singh. If you have any questions or concerns after your appointment, please call the clinic at [phone]. Press \"1\" for scheduling, press \"3\" for nurse advice.    2.   The following has been recommended for you based upon your appointment today:   -Continue your baking soda/salt water rinses    3.   We will call you with the results of your strep tests.    Ericka ESPINOZA, RN  [facility]  Department of Otolaryngology  [phone]      "

## 2023-04-11 NOTE — NURSING NOTE
No chief complaint on file.      Blood pressure 103/69, pulse 72, weight 54.4 kg (120 lb), not currently breastfeeding.    Ella Sr, EMT

## 2023-04-11 NOTE — LETTER
2023       RE: Alpa Whitney  [address]  [facility] [address]     Dear Colleague,    Thank you for referring your patient, Alpa Whitney, to the [facility] [place] at [facility]. Please see a copy of my visit note below.      Otolaryngology Clinic      Name: Alpa Whitney  MRN: [id number]  Age: 23 year old  : 2000  Referring provider: Yessenia Barnes  2023      Chief Complaint:  Consultation    History of Present Illness:   Alpa Whitney is a 23 year old female who presents for consultation regarding chronic throat pain. She recently presented to the  and was treated with course of steroids and antibiotics. She has presented on multiple times over the last year with 1 true positive strep test. She reports intermittent throat pain for the past year with a few weeks at a time of symptom relief. Over the past two months she has had consistent discomfort in the throat even after completion of abx/steroids. She reports bilateral otalgia with her throat pain.  She is allergic to grass and reports seasonal allergies.     She does work as a .     Active Medications:     Current Outpatient Medications:     aspirin-acetaminophen-caffeine (EXCEDRIN MIGRAINE) 250-250-65 MG tablet, Take 1 tablet by mouth at onset of headache for headaches Maximum is 2 tablets in 24 hours, Disp: 30 tablet, Rfl: 1    cetirizine (ZYRTEC) 10 MG tablet, TAKE 1 TABLET BY MOUTH EVERY DAY, Disp: 30 tablet, Rfl: 39    etonogestrel (NEXPLANON) 68 MG IMPL, 1 each (68 mg) by Subdermal route once, Disp:  , Rfl:     ibuprofen (ADVIL/MOTRIN) 200 MG capsule, Take 200 mg by mouth every 4 hours as needed for fever, Disp: , Rfl:     ondansetron (ZOFRAN ODT) 4 MG ODT tab, Take 1 tablet (4 mg) by mouth every 6 hours as needed for nausea, Disp: 30 tablet, Rfl: 1    potassium  aminobenzoate 500 MG CAPS capsule, , Disp: , Rfl:     vitamin D2 (ERGOCALCIFEROL) 96762 units (1250 mcg) capsule, Take 1 capsule (50,000 Units) by mouth once a week, Disp: 12 capsule, Rfl: 3      Allergies:   Patient has no known allergies.      Past Medical History:  Past Medical History:   Diagnosis Date    Chronic rhinitis     Depressed     hospitalized for SI 7/2017    Depressive disorder     FH: sudden cardiac death (SCD)     TBI (traumatic brain injury) 06/17/2020     Patient Active Problem List   Diagnosis    Moderate single current episode of major depressive disorder (H)    Hallucinations, auditory and visual associated wtih mood disorder    FH: sudden cardiac death (SCD)    Nexplanon insertion    Fibromyalgia        Past Surgical History:  Past Surgical History:   Procedure Laterality Date    NO HISTORY OF SURGERY         Family History:   Family History   Problem Relation Age of Onset    Diabetes Paternal Grandfather     Family History Negative Mother     Family History Negative Father     No Known Problems Brother     No Known Problems Sister     Diabetes Maternal Grandmother     Glaucoma No family hx of     Macular Degeneration No family hx of     Migraines Father          Social History:   Social History     Tobacco Use    Smoking status: Never    Smokeless tobacco: Never   Vaping Use    Vaping status: Never Used   Substance Use Topics    Alcohol use: No     Alcohol/week: 0.0 standard drinks of alcohol    Drug use: Yes     Types: Marijuana       Review of Systems:   Pertinent items are noted in HPI or as in patient entered ROS below, remainder of complete ROS is negative.       4/10/2023     5:32 PM   [facility] ENT ROS   Constitutional Appetite change    Unexplained fatigue    Problems with sleep    Unexplained fever or night sweats   Neurology Dizzy spells    Headache   Psychology Frequently feeling depressed or sad   Eyes Double vision   Ears, Nose, Throat Hearing loss    Ear pain    Ringing/noise in ears     Nasal congestion or drainage    Sore throat    Trouble swallowing   Cardiopulmonary Breathing problems    Chest pain   Gastrointestinal/Genitourinary Unexplained nausea/vomiting    Constipation   Musculoskeletal Sore or stiff joints    Back pain    Neck pain   Endocrine Thirst    Heat or cold intolerance    Frequent urination         Physical Exam:   /69   Pulse 72   Wt 54.4 kg (120 lb)   BMI 23.44 kg/m       Constitutional:  The patient was unaccompanied, well-groomed, and in no acute distress.    Skin:  Warm and pink.    Neurologic:  Alert and oriented x 3.  CN's III-XII within normal limits.  Voice normal.   Psychiatric:  The patient's affect was calm, cooperative, and appropriate.    Respiratory:  Breathing comfortably without stridor or exertion of accessory muscles.    Eyes: Extraocular movement intact.    Head:  Normocephalic and atraumatic.  No lesions or scars.    Ears:  Pinnae and tragus non-tender.  EAC's and TM's were clear.   Nose:  Sinuses were non-tender.  Anterior rhinoscopy revealed midline septum and absence of purulence or polyps.    OC/OP:  Normal tongue, floor of mouth, buccal mucosa, and palate.  No lesions or masses on inspection or palpation.  No abnormal lymph tissue in the oropharynx.  The pterygoid region is non-tender.  Tonsils are small.   Neck:  Supple with normal laryngeal and tracheal landmarks.  The parotid beds were without masses.  No palpable thyroid.  Lymphatic:  There is no palpable lymphadenopathy in the neck.     Assessment and Plan:  Alpa Whitney is a 23 year old female presenting with persistent throat pain despite treatment for positive strep 2 months ago. I obtained a strep culture today and will update her with the results. If it is negative I would consider starting her on an antihistamine and pursue allergy testing as I have seen seasonal allergies contribute to throat pain like this before.         Scribe Disclosure:  I, Chavez Scott, am  serving as a scribe to document services personally performed by Greg Singh MD at this visit, based upon the provider's statements to me. All documentation has been reviewed by the aforementioned provider prior to being entered into the official medical record.       Again, thank you for allowing me to participate in the care of your patient.      Sincerely,    Greg Singh MD

## 2023-04-11 NOTE — PROGRESS NOTES
Otolaryngology Clinic      Name: Alpa Whitney  MRN: [id number]  Age: 23 year old  : 2000  Referring provider: Yessenia Barnes  2023      Chief Complaint:  Consultation    History of Present Illness:   Alpa Whitney is a 23 year old female who presents for consultation regarding chronic throat pain. She recently presented to the  and was treated with course of steroids and antibiotics. She has presented on multiple times over the last year with 1 true positive strep test. She reports intermittent throat pain for the past year with a few weeks at a time of symptom relief. Over the past two months she has had consistent discomfort in the throat even after completion of abx/steroids. She reports bilateral otalgia with her throat pain.  She is allergic to grass and reports seasonal allergies.     She does work as a .     Active Medications:     Current Outpatient Medications:      aspirin-acetaminophen-caffeine (EXCEDRIN MIGRAINE) 250-250-65 MG tablet, Take 1 tablet by mouth at onset of headache for headaches Maximum is 2 tablets in 24 hours, Disp: 30 tablet, Rfl: 1     cetirizine (ZYRTEC) 10 MG tablet, TAKE 1 TABLET BY MOUTH EVERY DAY, Disp: 30 tablet, Rfl: 39     etonogestrel (NEXPLANON) 68 MG IMPL, 1 each (68 mg) by Subdermal route once, Disp:  , Rfl:      ibuprofen (ADVIL/MOTRIN) 200 MG capsule, Take 200 mg by mouth every 4 hours as needed for fever, Disp: , Rfl:      ondansetron (ZOFRAN ODT) 4 MG ODT tab, Take 1 tablet (4 mg) by mouth every 6 hours as needed for nausea, Disp: 30 tablet, Rfl: 1     potassium aminobenzoate 500 MG CAPS capsule, , Disp: , Rfl:      vitamin D2 (ERGOCALCIFEROL) 72811 units (1250 mcg) capsule, Take 1 capsule (50,000 Units) by mouth once a week, Disp: 12 capsule, Rfl: 3      Allergies:   Patient has no known allergies.      Past Medical History:  Past Medical History:   Diagnosis Date     Chronic rhinitis      Depressed      hospitalized for SI 7/2017     Depressive disorder      FH: sudden cardiac death (SCD)      TBI (traumatic brain injury) 06/17/2020     Patient Active Problem List   Diagnosis     Moderate single current episode of major depressive disorder (H)     Hallucinations, auditory and visual associated wtih mood disorder     FH: sudden cardiac death (SCD)     Nexplanon insertion     Fibromyalgia        Past Surgical History:  Past Surgical History:   Procedure Laterality Date     NO HISTORY OF SURGERY         Family History:   Family History   Problem Relation Age of Onset     Diabetes Paternal Grandfather      Family History Negative Mother      Family History Negative Father      No Known Problems Brother      No Known Problems Sister      Diabetes Maternal Grandmother      Glaucoma No family hx of      Macular Degeneration No family hx of      Migraines Father          Social History:   Social History     Tobacco Use     Smoking status: Never     Smokeless tobacco: Never   Vaping Use     Vaping status: Never Used   Substance Use Topics     Alcohol use: No     Alcohol/week: 0.0 standard drinks of alcohol     Drug use: Yes     Types: Marijuana       Review of Systems:   Pertinent items are noted in HPI or as in patient entered ROS below, remainder of complete ROS is negative.       4/10/2023     5:32 PM   [facility] ENT ROS   Constitutional Appetite change    Unexplained fatigue    Problems with sleep    Unexplained fever or night sweats   Neurology Dizzy spells    Headache   Psychology Frequently feeling depressed or sad   Eyes Double vision   Ears, Nose, Throat Hearing loss    Ear pain    Ringing/noise in ears    Nasal congestion or drainage    Sore throat    Trouble swallowing   Cardiopulmonary Breathing problems    Chest pain   Gastrointestinal/Genitourinary Unexplained nausea/vomiting    Constipation   Musculoskeletal Sore or stiff joints    Back pain    Neck pain   Endocrine Thirst    Heat or cold intolerance    Frequent  urination         Physical Exam:   /69   Pulse 72   Wt 54.4 kg (120 lb)   BMI 23.44 kg/m       Constitutional:  The patient was unaccompanied, well-groomed, and in no acute distress.    Skin:  Warm and pink.    Neurologic:  Alert and oriented x 3.  CN's III-XII within normal limits.  Voice normal.   Psychiatric:  The patient's affect was calm, cooperative, and appropriate.    Respiratory:  Breathing comfortably without stridor or exertion of accessory muscles.    Eyes: Extraocular movement intact.    Head:  Normocephalic and atraumatic.  No lesions or scars.    Ears:  Pinnae and tragus non-tender.  EAC's and TM's were clear.   Nose:  Sinuses were non-tender.  Anterior rhinoscopy revealed midline septum and absence of purulence or polyps.    OC/OP:  Normal tongue, floor of mouth, buccal mucosa, and palate.  No lesions or masses on inspection or palpation.  No abnormal lymph tissue in the oropharynx.  The pterygoid region is non-tender.  Tonsils are small.   Neck:  Supple with normal laryngeal and tracheal landmarks.  The parotid beds were without masses.  No palpable thyroid.  Lymphatic:  There is no palpable lymphadenopathy in the neck.     Assessment and Plan:  Alpa Whitney is a 23 year old female presenting with persistent throat pain despite treatment for positive strep 2 months ago. I obtained a strep culture today and will update her with the results. If it is negative I would consider starting her on an antihistamine and pursue allergy testing as I have seen seasonal allergies contribute to throat pain like this before.         Scribe Disclosure:  I, Chavez Scott, am serving as a scribe to document services personally performed by Greg Singh MD at this visit, based upon the provider's statements to me. All documentation has been reviewed by the aforementioned provider prior to being entered into the official medical record.

## 2023-04-13 LAB — BACTERIA SPEC CULT: NORMAL

## 2023-05-08 ENCOUNTER — E-VISIT (OUTPATIENT)
Dept: FAMILY MEDICINE | Facility: CLINIC | Age: 23
End: 2023-05-08
Payer: COMMERCIAL

## 2023-05-08 DIAGNOSIS — R19.7 VOMITING AND DIARRHEA: Primary | ICD-10-CM

## 2023-05-08 DIAGNOSIS — R11.10 VOMITING AND DIARRHEA: Primary | ICD-10-CM

## 2023-05-08 PROCEDURE — 99421 OL DIG E/M SVC 5-10 MIN: CPT | Performed by: STUDENT IN AN ORGANIZED HEALTH CARE EDUCATION/TRAINING PROGRAM

## 2023-05-08 NOTE — PATIENT INSTRUCTIONS
Dear Alpa Whitney,    I am sorry you are not feeling well. Based on the responses you provided, you may be experiencing a serious health condition that needs immediate in-person attention. It is recommended that you be seen in the emergency room in order to better evaluate your symptoms. Please click here to find the nearest Emergency Room. With the blood in your stool it is concerning along with the vomiting. We are concerned about dehydration at this time and you receiving IV fluids is important right now.     Please keep me posted    MABEL DOTSON MD    Diarrhea with Uncertain Cause (Adult)    Diarrhea is when stools are loose and watery. This can be caused by:     Viral infections    Bacterial infections    Food poisoning    Parasites    Irritable bowel syndrome (IBS)    Inflammatory bowel diseases such as ulcerative colitis, Crohn's disease, and celiac disease    Food intolerance, such as to lactose, the sugar found in milk and milk products    Reaction to medicines like antibiotics, laxatives, cancer medicines, and antacids  Along with diarrhea, you may also have:    Abdominal pain and cramping    Nausea and vomiting    Loss of bowel control    Fever and chills    Bloody stools  In some cases, antibiotics may help to treat diarrhea. You may have a stool sample test which is done to see what is causing your diarrhea, and if antibiotics will help treat it. The results of a stool sample test may take up to 2 days. The healthcare provider may not give you antibiotics until they have the stool test results.   Diarrhea can cause dehydration. This is the loss of too much water and other fluids from the body. When this occurs, you must replace those body fluids. This can be done with oral rehydration solutions. Oral rehydration solutions are available at drugstores and grocery stores without a prescription. Sports drinks are not the best choice if you are very dehydrated. They usually have too much  sugar and not enough electrolytes.   Home care  Follow all instructions given by your healthcare provider. Rest at home for the next 24 hours, or until you feel better. Avoid caffeine, tobacco, and alcohol. These can make diarrhea, cramping, and pain worse.   If taking medicines:    Over-the-counter nausea and diarrhea medicines are generally OK unless you experience fever or blood in the stool. Check with your healthcare provider first in those circumstances.    You may use acetaminophen or nonsteroidal anti-inflammatory drugs (NSAIDs) such as ibuprofen or naproxen to reduce pain and fever. Don t use these if you have chronic liver or kidney disease, or ever had a stomach ulcer or gastrointestinal bleeding. Don't use NSAID medicines if you are already taking one for another condition (like arthritis) or are on daily aspirin therapy (such as for heart disease or after a stroke). Talk with your healthcare provider first.    If antibiotics were prescribed, be sure you take them until they are finished. Don t stop taking them even when you feel better. Antibiotics must be taken exactly as prescribed.  To prevent the spread of illness:    Remember that washing with soap and clean, running water and using alcohol-based  is the best way to prevent the spread of infection. Dry your hands with a single-use towel (like a paper towel).    Clean the toilet after each use.    Wash your hands before eating.    Wash your hands before and after preparing food. Keep in mind that people with diarrhea or vomiting should not prepare food for others.    Wash your hands after using cutting boards, counter tops, and knives that have been in contact with raw foods.    Wash and then peel fruits and vegetables.    Keep uncooked meats away from cooked and ready-to-eat foods.    Use a food thermometer when cooking. Cook poultry to at least 165 F (74 C). Cook ground meat (beef, veal, pork, lamb) to at least 160 F (71 C). Cook fresh  beef, veal, lamb, and pork to at least 145 F (63 C).    Don t eat raw or undercooked eggs (poached or ted side up), poultry, meat, or unpasteurized milk and juices.  Food and drinks  The main goal while treating vomiting or diarrhea is to prevent dehydration. This is done by taking small amounts of liquids often.     Keep in mind that liquids are more important than food right now.    Drink only small amounts of liquids at a time.    Don t force yourself to eat, especially if you are having cramping, vomiting, or diarrhea. Don t eat large amounts at a time, even if you are hungry.    If you eat, avoid fatty, greasy, spicy, or fried foods.    Don t eat dairy foods or drink milk if you have diarrhea. These can make diarrhea worse.  During the first 24 hours you can try:    Oral rehydration solutions.  Sports drinks may be used if you are not too dehydrated and are otherwise healthy.    Soft drinks without caffeine    Ginger ale    Water (plain or flavored)    Decaf tea or coffee    Clear broth, consommé, or bouillon    Gelatin, ice pops, or frozen fruit juice bars  The second 24 hours, if you are feeling better, you can add:    Hot cereal, plain toast, bread, rolls, or crackers    Plain noodles, rice, mashed potatoes, chicken noodle soup, or rice soup    Applesauce, unsweetened canned fruit (no pineapple)    Bananas  As you recover:    Limit fat intake to less than 15 grams per day. Don t eat margarine, butter, oils, mayonnaise, sauces, gravies, fried foods, peanut butter, meat, poultry, or fish.    Limit fiber. Don t eat raw or cooked vegetables, fresh fruits except bananas, or bran cereals.    Limit caffeine and chocolate.    Limit dairy.    Don t use spices or seasonings except salt.    Go back to your normal diet over time, as you feel better and your symptoms improve.    If the symptoms come back, go back to a simple diet or clear liquids.  Follow-up care  Follow up with your healthcare provider, or as advised.  If a stool sample was taken or cultures were done, call the healthcare provider for the results as instructed.   Call 911  Call 911 if you have any of these symptoms:     Trouble breathing    Confusion    Extreme drowsiness or trouble walking    Loss of consciousness    Rapid heart rate    Chest pain    Stiff neck    Seizure  When to get medical advice  Call your healthcare provider right away if any of these occur:     Abdominal pain that gets worse    Constant lower right abdominal pain    Continued vomiting and inability to keep liquids down    Diarrhea more than 5 times a day    Blood in vomit or stool    Dark urine or no urine for 8 hours, dry mouth and tongue, tiredness, weakness, or dizziness    Drowsiness    New rash    You don t get better in 2 to 3 days    Fever of 100.4 F (38 C) or higher, or as advised by your provider  [employer] last reviewed this educational content on 2/1/2022 2000-2022 [employer]. All rights reserved. This information is not intended as a substitute for professional medical care. Always follow your healthcare professional's instructions.

## 2023-05-17 ENCOUNTER — TELEPHONE (OUTPATIENT)
Dept: OTOLARYNGOLOGY | Facility: CLINIC | Age: 23
End: 2023-05-17
Payer: COMMERCIAL

## 2023-05-17 DIAGNOSIS — J03.90 TONSILLITIS: Primary | ICD-10-CM

## 2023-05-17 NOTE — TELEPHONE ENCOUNTER
This writer left a message with the patient informing them that their strep test was negative, and that they should start taking an antihistamine as advised by Dr. Singh.  This writer also inquired whether the patient would like us to issue and allergy referral so they may be tested for allergies.  This writer left the patient with the call center number in the event that they would like to request an allergy referral or if they have any further questions or concerns.  Gaby Spicer LPN

## 2023-05-24 NOTE — TELEPHONE ENCOUNTER
This writer created a referral to the Allergy department and contacted the patient to provide her with the phone number to schedule herself for a consultation.  The patient verbalized understanding of the plan and had no further questions at the time of the call.  Gaby Spicer LPN

## 2023-06-06 ENCOUNTER — OFFICE VISIT (OUTPATIENT)
Dept: FAMILY MEDICINE | Facility: CLINIC | Age: 23
End: 2023-06-06
Payer: COMMERCIAL

## 2023-06-06 ENCOUNTER — ANCILLARY PROCEDURE (OUTPATIENT)
Dept: GENERAL RADIOLOGY | Facility: CLINIC | Age: 23
End: 2023-06-06
Attending: INTERNAL MEDICINE
Payer: COMMERCIAL

## 2023-06-06 VITALS
SYSTOLIC BLOOD PRESSURE: 108 MMHG | RESPIRATION RATE: 15 BRPM | TEMPERATURE: 97.9 F | WEIGHT: 116.5 LBS | DIASTOLIC BLOOD PRESSURE: 75 MMHG | OXYGEN SATURATION: 99 % | HEART RATE: 85 BPM | HEIGHT: 60 IN | BODY MASS INDEX: 22.87 KG/M2

## 2023-06-06 DIAGNOSIS — M25.511 ACUTE PAIN OF RIGHT SHOULDER: ICD-10-CM

## 2023-06-06 DIAGNOSIS — M25.511 ACUTE PAIN OF RIGHT SHOULDER: Primary | ICD-10-CM

## 2023-06-06 PROBLEM — Z12.4 CERVICAL CANCER SCREENING: Status: ACTIVE | Noted: 2023-06-06

## 2023-06-06 PROCEDURE — 73030 X-RAY EXAM OF SHOULDER: CPT | Mod: TC | Performed by: RADIOLOGY

## 2023-06-06 PROCEDURE — 99214 OFFICE O/P EST MOD 30 MIN: CPT | Performed by: INTERNAL MEDICINE

## 2023-06-06 RX ORDER — NAPROXEN 500 MG/1
500 TABLET ORAL 2 TIMES DAILY WITH MEALS
Qty: 20 TABLET | Refills: 0 | Status: SHIPPED | OUTPATIENT
Start: 2023-06-06 | End: 2023-06-16

## 2023-06-06 ASSESSMENT — PATIENT HEALTH QUESTIONNAIRE - PHQ9
SUM OF ALL RESPONSES TO PHQ QUESTIONS 1-9: 10
10. IF YOU CHECKED OFF ANY PROBLEMS, HOW DIFFICULT HAVE THESE PROBLEMS MADE IT FOR YOU TO DO YOUR WORK, TAKE CARE OF THINGS AT HOME, OR GET ALONG WITH OTHER PEOPLE: SOMEWHAT DIFFICULT
SUM OF ALL RESPONSES TO PHQ QUESTIONS 1-9: 10

## 2023-06-06 ASSESSMENT — PAIN SCALES - GENERAL: PAINLEVEL: SEVERE PAIN (6)

## 2023-06-06 NOTE — PROGRESS NOTES
Assessment & Plan     1.  Acute pain of the right shoulder.  Could be acute bursitis/tendinitis.  Doubtful that she has rotator cuff syndrome.  X-ray negative for fracture.  Advise Naprosyn 500 mg twice a day and if not better over the next few days then perhaps next week we can reassess and consider MRI of the shoulder.  She has severe restriction of motion and seems to be in quite a bit of pain.      Danis Ellington MD  [facility] SEDRICK Chicas is a 23 year old, presenting for the following health issues:  Shoulder Pain (Right shoulder)        6/6/2023     1:07 PM   Additional Questions   Roomed by erik   Accompanied by self     Shoulder Pain    History of Present Illness       Reason for visit:  My shoulder feels like it s out of place  Symptom onset:  1-3 days ago  Symptom intensity:  Moderate  Symptom progression:  Worsening  Had these symptoms before:  No  What makes it worse:  Moving arm  What makes it better:  No    She eats 4 or more servings of fruits and vegetables daily.She consumes 3 sweetened beverage(s) daily.She exercises with enough effort to increase her heart rate 30 to 60 minutes per day.  She exercises with enough effort to increase her heart rate 5 days per week.   She is taking medications regularly.    Today's PHQ-9         PHQ-9 Total Score: 10    PHQ-9 Q9 Thoughts of better off dead/self-harm past 2 weeks :   Not at all    How difficult have these problems made it for you to do your work, take care of things at home, or get along with other people: Somewhat difficult     A 23-year-old young lady comes in with severe right shoulder pain.  Started on Saturday after having helped her mother clean an apartment.  She did not injure the shoulder.  The pain got worst on Monday and today.  Today is her fourth day of symptoms.  She keeps her arm next to her chest because slightest movement is painful.  She does not have any significant health history in the  "past.  She did take a couple of ibuprofen at home.  She works at a  and so lifts children all the time.  She has not done any lifting today.  No fever or chills.  No redness or swelling.      Review of Systems   Constitutional, HEENT, cardiovascular, pulmonary, GI, , musculoskeletal, neuro, skin, endocrine and psych systems are negative, except as otherwise noted.      Objective    /75 (BP Location: Left arm, Patient Position: Sitting, Cuff Size: Adult Regular)   Pulse 85   Temp 97.9  F (36.6  C) (Oral)   Resp 15   Ht 1.535 m (5' 0.43\")   Wt 52.8 kg (116 lb 8 oz)   SpO2 99%   BMI 22.43 kg/m    Body mass index is 22.43 kg/m .  Physical Exam   GENERAL: healthy, alert and no distress  MS: Right shoulder examination reveals no swelling or redness.  There is definite tenderness along the joint space but no AC joint tenderness.  Bicipital insertion site also is tender.  Abduction passively is about 30 degrees.  Internal and external rotation is markedly limited by pain.    X-ray of the right shoulder performed and I reviewed with the patient.  It looks normal to me.                  "

## 2023-08-17 ENCOUNTER — OFFICE VISIT (OUTPATIENT)
Dept: FAMILY MEDICINE | Facility: CLINIC | Age: 23
End: 2023-08-17
Payer: COMMERCIAL

## 2023-08-17 VITALS
BODY MASS INDEX: 23.48 KG/M2 | WEIGHT: 119.6 LBS | HEART RATE: 88 BPM | RESPIRATION RATE: 20 BRPM | SYSTOLIC BLOOD PRESSURE: 92 MMHG | DIASTOLIC BLOOD PRESSURE: 60 MMHG | TEMPERATURE: 97.9 F | OXYGEN SATURATION: 99 % | HEIGHT: 60 IN

## 2023-08-17 DIAGNOSIS — G43.109 MIGRAINE WITH AURA AND WITHOUT STATUS MIGRAINOSUS, NOT INTRACTABLE: Primary | ICD-10-CM

## 2023-08-17 DIAGNOSIS — M79.7 FIBROMYALGIA: ICD-10-CM

## 2023-08-17 PROCEDURE — 99214 OFFICE O/P EST MOD 30 MIN: CPT | Performed by: STUDENT IN AN ORGANIZED HEALTH CARE EDUCATION/TRAINING PROGRAM

## 2023-08-17 RX ORDER — PREGABALIN 75 MG/1
75 CAPSULE ORAL 2 TIMES DAILY
Qty: 60 CAPSULE | Refills: 0 | Status: SHIPPED | OUTPATIENT
Start: 2023-08-17 | End: 2023-09-27

## 2023-08-17 RX ORDER — TOPIRAMATE 25 MG/1
25 TABLET, FILM COATED ORAL 2 TIMES DAILY
Qty: 60 TABLET | Refills: 0 | Status: SHIPPED | OUTPATIENT
Start: 2023-08-17 | End: 2023-09-08

## 2023-08-17 RX ORDER — SUMATRIPTAN 50 MG/1
50 TABLET, FILM COATED ORAL
Qty: 30 TABLET | Refills: 1 | Status: SHIPPED | OUTPATIENT
Start: 2023-08-17

## 2023-08-17 ASSESSMENT — PATIENT HEALTH QUESTIONNAIRE - PHQ9
SUM OF ALL RESPONSES TO PHQ QUESTIONS 1-9: 11
SUM OF ALL RESPONSES TO PHQ QUESTIONS 1-9: 11
10. IF YOU CHECKED OFF ANY PROBLEMS, HOW DIFFICULT HAVE THESE PROBLEMS MADE IT FOR YOU TO DO YOUR WORK, TAKE CARE OF THINGS AT HOME, OR GET ALONG WITH OTHER PEOPLE: SOMEWHAT DIFFICULT

## 2023-08-17 ASSESSMENT — ENCOUNTER SYMPTOMS: HEADACHES: 1

## 2023-08-17 NOTE — PROGRESS NOTES
"  Assessment & Plan     (G43.109) Migraine with aura and without status migrainosus, not intractable  (primary encounter diagnosis)  Comment: Chronic, uncontrolled. Pt has yet to establish care with Neurology and will place referral. Will start maintenance medication of Topamax and abortive medication of Imitrex. Pt to follow up in 1-2 months  Plan: Adult Neurology  Referral, topiramate         (TOPAMAX) 25 MG tablet, SUMAtriptan (IMITREX)         50 MG tablet            (M79.7) Fibromyalgia  Comment: Chronic, uncontrolled. Pt to establish care with Rheumatology. Historically- pt was seen by physical therapy and pain management. Cymbalta has been flagged as an allergy secondary to side effects. Will start Lyrica for better pain control.   Plan: pregabalin (LYRICA) 75 MG capsule, Adult         Rheumatology  Referral        Pending                  MABEL DOTSON MD  [facility] SARA Chicas is a 23 year old, presenting for the following health issues:  Headache        8/17/2023    11:17 AM   Additional Questions   Roomed by [facility]       Headache     History of Present Illness       Headaches:   Since the patient's last clinic visit, headaches are: worsened  The patient is getting headaches:  Everyday  She is able to do normal daily activities when she has a migraine.  The patient is taking the following rescue/relief medications:  No rescue/relief medications   Patient states \"I get only a small amount of relief\" from the rescue/relief medications.   The patient is taking the following medications to prevent migraines:  No medications to prevent migraines  In the past 4 weeks, the patient has gone to an Urgent Care or Emergency Room 0 times times due to headaches.    Reason for visit:  Headache dont feel normal and my muscles hurt more than usual    She eats 4 or more servings of fruits and vegetables daily.She consumes 4 sweetened beverage(s) daily.She exercises " "with enough effort to increase her heart rate 30 to 60 minutes per day.  She exercises with enough effort to increase her heart rate 6 days per week. She is missing 1 dose(s) of medications per week.  She is not taking prescribed medications regularly due to remembering to take.     Migraines  Patient reports that her headaches have been progressively worsening. She endorses headaches since her younger years and states they have worsened since she had her concussion. Concussion occurred 2-3 years ago. Headaches are described as \"pressure as if something is squishing my head\". She endorses nausea and lightheadedness. She endorses at times feeling like she has to pass out and states that she is unable to sleep and put her hair up in the bun. She reports having to decrease her work to 3 times a week because the headaches are debilitating    Fibromyalgia  She reports that walking and taking the stairs have been difficult. She reports that her knees and hips are hurting worse. Pain is rated as a constant dull ache and at times at its max can be up to an 8.She reports that she tried taking the Cymbalta and states that she had side effects to the medication                 Review of Systems   Neurological:  Positive for headaches.            Objective    BP 92/60   Pulse 88   Temp 97.9  F (36.6  C) (Temporal)   Resp 20   Ht 1.535 m (5' 0.43\")   Wt 54.3 kg (119 lb 9.6 oz)   SpO2 99%   BMI 23.03 kg/m    Body mass index is 23.03 kg/m .  Physical Exam   GENERAL: healthy, mild distress  EYES: Eyes grossly normal to inspection, PERRL and conjunctivae and sclerae normal  Neck: No visible JVD or lymphadenopathy   RESP: symmetrical rise in chest   CV: No peripheral edema notable   MS: no gross musculoskeletal defects noted  SKIN: no suspicious lesions or rashes  PSYCH: mentation appears normal, affect tired      No results found for any visits on 08/17/23.                  "

## 2023-09-08 DIAGNOSIS — G43.109 MIGRAINE WITH AURA AND WITHOUT STATUS MIGRAINOSUS, NOT INTRACTABLE: ICD-10-CM

## 2023-09-08 RX ORDER — TOPIRAMATE 25 MG/1
25 TABLET, FILM COATED ORAL 2 TIMES DAILY
Qty: 60 TABLET | Refills: 0 | Status: SHIPPED | OUTPATIENT
Start: 2023-09-08 | End: 2023-09-22

## 2023-09-13 ENCOUNTER — OFFICE VISIT (OUTPATIENT)
Dept: OBGYN | Facility: CLINIC | Age: 23
End: 2023-09-13
Payer: COMMERCIAL

## 2023-09-13 VITALS
BODY MASS INDEX: 23.18 KG/M2 | TEMPERATURE: 98.1 F | HEART RATE: 77 BPM | DIASTOLIC BLOOD PRESSURE: 71 MMHG | SYSTOLIC BLOOD PRESSURE: 109 MMHG | WEIGHT: 120.4 LBS

## 2023-09-13 DIAGNOSIS — Z30.46 SURVEILLANCE OF PREVIOUSLY PRESCRIBED IMPLANTABLE SUBDERMAL CONTRACEPTIVE: Primary | ICD-10-CM

## 2023-09-13 PROCEDURE — 11983 REMOVE/INSERT DRUG IMPLANT: CPT

## 2023-09-13 NOTE — PATIENT INSTRUCTIONS
What Nexplanon Users May Expect    For appropiate patients, Nexplanon is well tolerated and has a low early-removal rate.    Insertion site complications, such as prolonged pain or infection, are rare. Removal is occasionally difficult, and rarely requires a surgical procedure in the operating room.    Menstrual changes are common with Nexplanon. Bleeding may become more or less frequent or heavy, or absent. The bleeding pattern after the first three months is predictive of future bleeding, but the pattern may change at any time. Average bleeding is 18 days over 3 months. Over 50% of women experience rare over absent bleeding over the two year period, while 25% experience frequent or prolonged bleeding.    In clinical studies, users gained 3.7 pounds over two years. It is unknown what portion of this weight gain is related to Nexplanon    Women with a history of depressed mood may have worsening on Nexplanon, and may need to have the device removed.    Return to baseline ovulation patterns is seen 7-14 days after removal of Nexplanon.    Rarely, headaches and acne have also let to device removal.    Nexplanon may be less effective in women weight more than 130% of their ideal body weight.    Nexplanon does not protect against HIV or STDs.    Please call [facility] at [phone] if you have questions or concerns.    For more complete information:  [email or web address]

## 2023-09-22 DIAGNOSIS — G43.109 MIGRAINE WITH AURA AND WITHOUT STATUS MIGRAINOSUS, NOT INTRACTABLE: ICD-10-CM

## 2023-09-22 RX ORDER — TOPIRAMATE 25 MG/1
25 TABLET, FILM COATED ORAL 2 TIMES DAILY
Qty: 180 TABLET | Refills: 0 | Status: SHIPPED | OUTPATIENT
Start: 2023-09-22

## 2023-09-22 NOTE — TELEPHONE ENCOUNTER
FUTURE VISIT INFORMATION      FUTURE VISIT INFORMATION:  Date: 12.13.23  Time: 10:00  Location: [facility]  REFERRAL INFORMATION:  Referring provider:  Francisco  Referring providers clinic:  ENT  Reason for visit/diagnosis  Tonsillitis [J03.90] per pt garth mosqueda  epic referring by Gaby Spicer LPN      RECORDS REQUESTED FROM:       Clinic name Comments Records Status Imaging Status   ENT 4.11.23  [facility]

## 2023-09-24 ASSESSMENT — HEADACHE IMPACT TEST (HIT 6)
WHEN YOU HAVE HEADACHES HOW OFTEN IS THE PAIN SEVERE: VERY OFTEN
HOW OFTEN DID HEADACHS LIMIT CONCENTRATION ON WORK OR DAILY ACTIVITY: SOMETIMES
HOW OFTEN DO HEADACHES LIMIT YOUR DAILY ACTIVITIES: SOMETIMES
WHEN YOU HAVE A HEADACHE HOW OFTEN DO YOU WISH YOU COULD LIE DOWN: ALWAYS
HOW OFTEN HAVE YOU FELT FED UP OR IRRITATED BECAUSE OF YOUR HEADACHES: VERY OFTEN
HIT6 TOTAL SCORE: 65
HOW OFTEN HAVE YOU FELT TOO TIRED TO WORK BECAUSE OF YOUR HEADACHES: SOMETIMES

## 2023-09-26 ENCOUNTER — OFFICE VISIT (OUTPATIENT)
Dept: NEUROLOGY | Facility: CLINIC | Age: 23
End: 2023-09-26
Attending: STUDENT IN AN ORGANIZED HEALTH CARE EDUCATION/TRAINING PROGRAM
Payer: COMMERCIAL

## 2023-09-26 VITALS — HEART RATE: 62 BPM | SYSTOLIC BLOOD PRESSURE: 102 MMHG | DIASTOLIC BLOOD PRESSURE: 71 MMHG | OXYGEN SATURATION: 98 %

## 2023-09-26 DIAGNOSIS — M79.7 FIBROMYALGIA: ICD-10-CM

## 2023-09-26 DIAGNOSIS — G43.109 MIGRAINE WITH AURA AND WITHOUT STATUS MIGRAINOSUS, NOT INTRACTABLE: ICD-10-CM

## 2023-09-26 PROCEDURE — 99205 OFFICE O/P NEW HI 60 MIN: CPT | Performed by: PSYCHIATRY & NEUROLOGY

## 2023-09-26 RX ORDER — RIZATRIPTAN BENZOATE 10 MG/1
10 TABLET, ORALLY DISINTEGRATING ORAL
Qty: 18 TABLET | Refills: 3 | Status: SHIPPED | OUTPATIENT
Start: 2023-09-26

## 2023-09-26 RX ORDER — PROCHLORPERAZINE MALEATE 10 MG
10 TABLET ORAL EVERY 6 HOURS PRN
Qty: 30 TABLET | Refills: 3 | Status: SHIPPED | OUTPATIENT
Start: 2023-09-26

## 2023-09-26 NOTE — NURSING NOTE
"Alpa Whitney is a 23 year old female who presents for:  Chief Complaint   Patient presents with    Referral     Ref from Dr. Barnes, headaches make her feel faint - everyday. Migraines about 5 days a week spanning 30 minutes to 3 hours. Seem to be gradually getting worse.        Initial Vitals:  /71   Pulse 62   LMP 09/08/2023   SpO2 98%  Estimated body mass index is 23.18 kg/m  as calculated from the following:    Height as of 8/17/23: 1.535 m (5' 0.43\").    Weight as of 9/13/23: 54.6 kg (120 lb 6.4 oz).. There is no height or weight on file to calculate BSA. BP completed using cuff size: fabiola Alvarez    "

## 2023-09-26 NOTE — LETTER
9/26/2023         RE: Alpa Whitney  [address]  [facility] [address]        Dear Colleague,    Thank you for referring your patient, Alpa Whitney, to the [facility] NEUROLOG[facility]. Please see a copy of my visit note below.    [facility]   Headache Neurology Consult  September 26, 2023     Alpa Whitney MRN# [id number]   YOB: 2000 Age: 23 year old     Requesting provider:     Yessenia Barnes MD            Assessment and Recommendations:     Alpa Whitney is a 23 year old female with longstanding history of headaches, which obtained the associated migraine features with photophobia phonophobia nausea vomiting after she had a concussion in her late teens.    Due to the characteristics of concern including black spots suggestive of transient visual obscurations. She does not have papilledema on my exam today, but I did recommend she be evaluated with a dilated ophthalmologic exam by her ophthalmologist.  Reassuring from an II evaluation standpoint that headaches are not worsened by reclining and do not have any routine temporal onset.  We also discussed her constitutional symptoms of drenching night sweats, starting about a month ago coinciding with the timing of starting topiramate and pregabalin.  I reviewed the adverse effects of these medications and this was not documented as being one of them.  I recommended to her that she discuss this symptom with her primary care physician as this can be a presenting feature of Hodgkin's lymphoma and this does follow a bimodal distribution for presentation, one of the peaks in her age group. She has also endorsed a subjective temperature instability, but this can be experienced in migraine as well. She has not checked a formal temp at home.    Because of the symptoms, I ordered an MRI brain with and without contrast to evaluate for any signs of  abnormal enhancement or features consistent with IIH which could contribute to headaches.      Headache treatment plan:  Acute medication recommendations:  Rizatriptan 10mg, limit use to no more than 9 days per month to avoid of medication overuse headache    Nausea:  STOP taking zofran and start taking compazine 10mg    Preventative medication recommendations:  CONTINUE on pregabalin as previously prescribed   CHANGE how you take topiramate:    Increase the dosing on a titration schedule:    Week 1: 25mg morning/50 mg in evening  Week 2: 50 mg twice daily  Week 3: 50 mg morning/75 mg evening  Week 4: 75 mg twice daily  Week 5: 75 mg morning/100 mg evening  Week 6: 100 mg twice daily    We will trial this for 3 months for determining efficacy, we discussed the common side effects of this medication which she has had none    We discussed that topiramate decreases efficacy of hormonal contraceptives, she should utilize a barrier form of birth control while on this medication and that it does have a birth defect risks in pregnancy.    We will meet back in 3 months for a recheck.  I will reach out to [facility] with any additional recommendations based on MRI imaging once available.    Time spent today was 64 minutes in history, exam, chart review, counseling.    Arabella Bowers MD  Neurology            Chief Complaint:     Chief Complaint   Patient presents with     Referral     Ref from Dr. Barnes, headaches make her feel faint - everyday. Migraines about 5 days a week spanning 30 minutes to 3 hours. Seem to be gradually getting worse.           History is obtained from the patient and medical record.      Alpa Whitney is a 23 year old female whose first headache was at age 11. They were mild until she fell off her skateboard at age 21. She was taken to the ED.   Prior to the head injury, headaches were daily but tolerable. She had no associated migraine features in her youth, but same location on the  head would hurt. She would need to rest and would have worsened with activity. There was no throbbing.     She hit a rock on the skateboard and flew up and hit the ground. She did lose consciousness. She awoke and had a lump on left temple region. She had her first headache starting at the hospital that day and was phonophobic. Always has photophobia. She feels overstimulation from the pain.  There is osmophobia. Nausea is the worst part of the headaches, there will be vomiting during severe headaches.     She does feel like headaches have gotten gradually worse as she feels presyncopal starting in May and this is prior to taking any headache medication. She may be sitting or standing. She will go into a dark environment to quiet the headache and obtain some relief that way. She feels dehydrated and drinks 5 bottles of water.  Prior to the headache onset, she does have a dim, tiny bell ringing and this is somewhat pulsatile, described as a melodious sound. It is intermittent, sometimes it will be with the headaches, other times not.    Headache is holocephalic, but at times localizing to the temples and occiput bilaterally. It is throbbing pain. 7/10 is her pain average. She does have daily headaches, and rarely during the day will not have any headache at all. Her worst pain is 10/10 and she will be debilitated, laying in bed due to activity exacerbating the pain. She has the worst pain 10 days or less per month.     She also will note black spots -bilaterally and laterally in fields (tiny circles and appear when she has a headache and they do not spread or gradually get more intense). They do move with her eye movements. They last about 5 minutes. She is not sure if they always accompany headaches as she is not always aware that she has a headache.  She has some blurry left sided vision at times, rubbing eyes resolves it. No diplopia or sudden vision loss.   She fluctuates between hot and cold. Recently has been  profusely sweating at night (sheet drenching) and this started 1 month ago. No loss of appetite. She always fluctuates from weight. There is no temporal association, there is some induction with stretching. Sometimes she can not get relief only from lying down.             Past Medical History:     Past Medical History:   Diagnosis Date     Chronic rhinitis      Depressed     hospitalized for SI 7/2017     Depressive disorder, sees a therapist      FH: sudden cardiac death (SCD), in her paternal side (grandfather, uncle)      Nexplanon insertion 09/13/2023    inserted today 9/13/2023     TBI (traumatic brain injury) (H) 06/17/2020             Past Surgical History:     Past Surgical History:   Procedure Laterality Date     NO HISTORY OF SURGERY               Social History:     Social History     Socioeconomic History     Marital status: Single     Spouse name: Not on file     Number of children: Not on file     Years of education: Not on file     Highest education level: Not on file   Occupational History     Not on file   Tobacco Use     Smoking status: Never     Smokeless tobacco: Never   Vaping Use     Vaping Use: Never used   Substance and Sexual Activity     Alcohol use: No     Alcohol/week: 0.0 standard drinks of alcohol     Drug use: Yes     Types: Marijuana     Sexual activity: Yes     Partners: Male     Birth control/protection: Condom, Implant   Other Topics Concern     Parent/sibling w/ CABG, MI or angioplasty before 65F 55M? No   Social History Narrative     Not on file     Social Determinants of Health     Financial Resource Strain: Not on file   Food Insecurity: Not on file   Transportation Needs: Not on file   Physical Activity: Not on file   Stress: Not on file   Social Connections: Not on file   Interpersonal Safety: Not on file   Housing Stability: Not on file             Family History:     Family History   Problem Relation Age of Onset     Diabetes Paternal Grandfather      Family History  Negative Mother      Family History Negative Father      No Known Problems Brother      No Known Problems Sister      Diabetes Maternal Grandmother      Glaucoma No family hx of      Macular Degeneration No family hx of      Migraines Father              Allergies:      Allergies   Allergen Reactions     Cymbalta [Duloxetine Hcl] Dizziness     Coffee Bean Extract [Coffea Arabica] Hives             Medications:   Acute headache medications:     SUMAtriptan (IMITREX) 50 MG tablet, Take 1 tablet (50 mg) by mouth at onset of headache for migraine, May repeat in 2 hours. Max 4 tablets/24 hours., Disp: 30 tablet, Rfl: 1 - nausea as a side effect  Ibuprofen - worsen headaches, took very frequently in the past  Tylenol - worsen headaches, took very frequently in the past    Preventative headache medications:     pregabalin (LYRICA) 75 MG capsule, Take 1 capsule (75 mg) by mouth 2 times daily, Disp: 60 capsule, Rfl: 0 - fibromyalgia, has been on this for 1 month, has been somewhat helpful for headaches     topiramate (TOPAMAX) 25 MG tablet, TAKE 1 TABLET BY MOUTH TWICE A DAY, Disp: 180 tablet, Rfl: 0, has been on this for 1 month, has been somewhat helpful for headaches    Current Outpatient Medications:      cetirizine (ZYRTEC) 10 MG tablet, TAKE 1 TABLET BY MOUTH EVERY DAY, Disp: 30 tablet, Rfl: 39     potassium aminobenzoate 500 MG CAPS capsule, , Disp: , Rfl:      vitamin D2 (ERGOCALCIFEROL) 37729 units (1250 mcg) capsule, Take 1 capsule (50,000 Units) by mouth once a week, Disp: 12 capsule, Rfl: 3  -Nexplanon, placed after onset of night sweating          Physical Exam:   /71   Pulse 62   LMP 09/08/2023   SpO2 98%    Physical Exam:   Neurologic:   Mental Status Exam: Alert, awake and oriented to situation. No dysarthria. Speech of normal fluency.   Cranial Nerves: Fundoscopic exam with clear disc margins bilaterally. PERRLA, EOMs intact, no nystagmus, facial movements symmetric, facial sensation intact to light  touch, hearing intact to conversation, trapezius and SCMs 5/5 bilaterally, tongue midline and fully mobile. No tongue atrophy.    Motor: Normal tone in all four extremities, no atrophy. 5/5 strength bilaterally in shoulder abduction, elbow extensors and flexors, wrist extensors and flexors, hip flexors, knee extensors and flexors, dorsi- and plantarflexion. No tremors or abnormal movements noted.   Sensory: Sensation intact to pinprick and vibration sensation on arms and legs bilaterally.    Coordination: Finger-nose-finger intact bilaterally.  Rapidly alternating movements intact bilaterally in the upper extremities.  Normal finger tapping bilaterally.  Intact heel-shin bilaterally. Normal Romberg.   Reflexes: 2+ and symmetric in triceps, biceps, brachioradialis, patellar, Achilles, and plantars downgoing bilaterally.   Gait: Normal gait.  Able to toe and heel walk.  Tandem gait normal.  Head: Normocephalic, atraumatic.  No pain with palpation over the supraorbital notches, pain with palpation of the bilateral occipital nerves.    Neck: Normal range of motion with lateral head movements and neck flexion.  Eyes: No conjunctival injection, no scleral icterus.           Data:     CT head normal from report.          Again, thank you for allowing me to participate in the care of your patient.        Sincerely,        Arabella Bowers MD

## 2023-09-26 NOTE — PROGRESS NOTES
[facility]   Headache Neurology Consult  September 26, 2023     Alpa Whitney MRN# [id number]   YOB: 2000 Age: 23 year old     Requesting provider:     Yessenia Barnes MD            Assessment and Recommendations:     Alpa Whitney is a 23 year old female with longstanding history of headaches, which obtained the associated migraine features with photophobia phonophobia nausea vomiting after she had a concussion in her late teens.    Due to the characteristics of concern including black spots suggestive of transient visual obscurations. She does not have papilledema on my exam today, but I did recommend she be evaluated with a dilated ophthalmologic exam by her ophthalmologist.  Reassuring from an IIH evaluation standpoint that headaches are not worsened by reclining and do not have any routine temporal onset.  We also discussed her constitutional symptoms of drenching night sweats, starting about a month ago coinciding with the timing of starting topiramate and pregabalin.  I reviewed the adverse effects of these medications and this was not documented as being one of them.  I recommended to her that she discuss this symptom with her primary care physician as this can be a presenting feature of Hodgkin's lymphoma and this does follow a bimodal distribution for presentation, one of the peaks in her age group. She has also endorsed a subjective temperature instability, but this can be experienced in migraine as well. She has not checked a formal temp at home.    Because of the symptoms, I ordered an MRI brain with and without contrast to evaluate for any signs of abnormal enhancement or features consistent with IIH which could contribute to headaches.      Headache treatment plan:  Acute medication recommendations:  Rizatriptan 10mg, limit use to no more than 9 days per month to avoid of medication overuse headache    Nausea:  STOP taking zofran  and start taking compazine 10mg    Preventative medication recommendations:  CONTINUE on pregabalin as previously prescribed   CHANGE how you take topiramate:    Increase the dosing on a titration schedule:    Week 1: 25mg morning/50 mg in evening  Week 2: 50 mg twice daily  Week 3: 50 mg morning/75 mg evening  Week 4: 75 mg twice daily  Week 5: 75 mg morning/100 mg evening  Week 6: 100 mg twice daily    We will trial this for 3 months for determining efficacy, we discussed the common side effects of this medication which she has had none    We discussed that topiramate decreases efficacy of hormonal contraceptives, she should utilize a barrier form of birth control while on this medication and that it does have a birth defect risks in pregnancy.    We will meet back in 3 months for a recheck.  I will reach out to [facility] with any additional recommendations based on MRI imaging once available.    Time spent today was 64 minutes in history, exam, chart review, counseling.    Arabella Bowers MD  Neurology            Chief Complaint:     Chief Complaint   Patient presents with    Referral     Ref from Dr. Barnes, headaches make her feel faint - everyday. Migraines about 5 days a week spanning 30 minutes to 3 hours. Seem to be gradually getting worse.           History is obtained from the patient and medical record.      Alpa Whitney is a 23 year old female whose first headache was at age 11. They were mild until she fell off her skateboard at age 21. She was taken to the ED.   Prior to the head injury, headaches were daily but tolerable. She had no associated migraine features in her youth, but same location on the head would hurt. She would need to rest and would have worsened with activity. There was no throbbing.     She hit a rock on the skateboard and flew up and hit the ground. She did lose consciousness. She awoke and had a lump on left temple region. She had her first headache starting at the  hospital that day and was phonophobic. Always has photophobia. She feels overstimulation from the pain.  There is osmophobia. Nausea is the worst part of the headaches, there will be vomiting during severe headaches.     She does feel like headaches have gotten gradually worse as she feels presyncopal starting in May and this is prior to taking any headache medication. She may be sitting or standing. She will go into a dark environment to quiet the headache and obtain some relief that way. She feels dehydrated and drinks 5 bottles of water.  Prior to the headache onset, she does have a dim, tiny bell ringing and this is somewhat pulsatile, described as a melodious sound. It is intermittent, sometimes it will be with the headaches, other times not.    Headache is holocephalic, but at times localizing to the temples and occiput bilaterally. It is throbbing pain. 7/10 is her pain average. She does have daily headaches, and rarely during the day will not have any headache at all. Her worst pain is 10/10 and she will be debilitated, laying in bed due to activity exacerbating the pain. She has the worst pain 10 days or less per month.     She also will note black spots -bilaterally and laterally in fields (tiny circles and appear when she has a headache and they do not spread or gradually get more intense). They do move with her eye movements. They last about 5 minutes. She is not sure if they always accompany headaches as she is not always aware that she has a headache.  She has some blurry left sided vision at times, rubbing eyes resolves it. No diplopia or sudden vision loss.   She fluctuates between hot and cold. Recently has been profusely sweating at night (sheet drenching) and this started 1 month ago. No loss of appetite. She always fluctuates from weight. There is no temporal association, there is some induction with stretching. Sometimes she can not get relief only from lying down.             Past Medical  History:     Past Medical History:   Diagnosis Date    Chronic rhinitis     Depressed     hospitalized for SI 7/2017    Depressive disorder, sees a therapist     FH: sudden cardiac death (SCD), in her paternal side (grandfather, uncle)     Nexplanon insertion 09/13/2023    inserted today 9/13/2023    TBI (traumatic brain injury) (H) 06/17/2020             Past Surgical History:     Past Surgical History:   Procedure Laterality Date    NO HISTORY OF SURGERY               Social History:     Social History     Socioeconomic History    Marital status: Single     Spouse name: Not on file    Number of children: Not on file    Years of education: Not on file    Highest education level: Not on file   Occupational History    Not on file   Tobacco Use    Smoking status: Never    Smokeless tobacco: Never   Vaping Use    Vaping Use: Never used   Substance and Sexual Activity    Alcohol use: No     Alcohol/week: 0.0 standard drinks of alcohol    Drug use: Yes     Types: Marijuana    Sexual activity: Yes     Partners: Male     Birth control/protection: Condom, Implant   Other Topics Concern    Parent/sibling w/ CABG, MI or angioplasty before 65F 55M? No   Social History Narrative    Not on file     Social Determinants of Health     Financial Resource Strain: Not on file   Food Insecurity: Not on file   Transportation Needs: Not on file   Physical Activity: Not on file   Stress: Not on file   Social Connections: Not on file   Interpersonal Safety: Not on file   Housing Stability: Not on file             Family History:     Family History   Problem Relation Age of Onset    Diabetes Paternal Grandfather     Family History Negative Mother     Family History Negative Father     No Known Problems Brother     No Known Problems Sister     Diabetes Maternal Grandmother     Glaucoma No family hx of     Macular Degeneration No family hx of     Migraines Father              Allergies:      Allergies   Allergen Reactions    Cymbalta  [Duloxetine Hcl] Dizziness    Coffee Bean Extract [Coffea Arabica] Hives             Medications:   Acute headache medications:    SUMAtriptan (IMITREX) 50 MG tablet, Take 1 tablet (50 mg) by mouth at onset of headache for migraine, May repeat in 2 hours. Max 4 tablets/24 hours., Disp: 30 tablet, Rfl: 1 - nausea as a side effect  Ibuprofen - worsen headaches, took very frequently in the past  Tylenol - worsen headaches, took very frequently in the past    Preventative headache medications:    pregabalin (LYRICA) 75 MG capsule, Take 1 capsule (75 mg) by mouth 2 times daily, Disp: 60 capsule, Rfl: 0 - fibromyalgia, has been on this for 1 month, has been somewhat helpful for headaches    topiramate (TOPAMAX) 25 MG tablet, TAKE 1 TABLET BY MOUTH TWICE A DAY, Disp: 180 tablet, Rfl: 0, has been on this for 1 month, has been somewhat helpful for headaches    Current Outpatient Medications:     cetirizine (ZYRTEC) 10 MG tablet, TAKE 1 TABLET BY MOUTH EVERY DAY, Disp: 30 tablet, Rfl: 39    potassium aminobenzoate 500 MG CAPS capsule, , Disp: , Rfl:     vitamin D2 (ERGOCALCIFEROL) 72235 units (1250 mcg) capsule, Take 1 capsule (50,000 Units) by mouth once a week, Disp: 12 capsule, Rfl: 3  -Nexplanon, placed after onset of night sweating          Physical Exam:   /71   Pulse 62   LMP 09/08/2023   SpO2 98%    Physical Exam:   Neurologic:   Mental Status Exam: Alert, awake and oriented to situation. No dysarthria. Speech of normal fluency.   Cranial Nerves: Fundoscopic exam with clear disc margins bilaterally. PERRLA, EOMs intact, no nystagmus, facial movements symmetric, facial sensation intact to light touch, hearing intact to conversation, trapezius and SCMs 5/5 bilaterally, tongue midline and fully mobile. No tongue atrophy.    Motor: Normal tone in all four extremities, no atrophy. 5/5 strength bilaterally in shoulder abduction, elbow extensors and flexors, wrist extensors and flexors, hip flexors, knee extensors  and flexors, dorsi- and plantarflexion. No tremors or abnormal movements noted.   Sensory: Sensation intact to pinprick and vibration sensation on arms and legs bilaterally.    Coordination: Finger-nose-finger intact bilaterally.  Rapidly alternating movements intact bilaterally in the upper extremities.  Normal finger tapping bilaterally.  Intact heel-shin bilaterally. Normal Romberg.   Reflexes: 2+ and symmetric in triceps, biceps, brachioradialis, patellar, Achilles, and plantars downgoing bilaterally.   Gait: Normal gait.  Able to toe and heel walk.  Tandem gait normal.  Head: Normocephalic, atraumatic.  No pain with palpation over the supraorbital notches, pain with palpation of the bilateral occipital nerves.    Neck: Normal range of motion with lateral head movements and neck flexion.  Eyes: No conjunctival injection, no scleral icterus.           Data:     CT head normal from report.

## 2023-09-27 RX ORDER — PREGABALIN 75 MG/1
75 CAPSULE ORAL 2 TIMES DAILY
Qty: 60 CAPSULE | Refills: 0 | Status: SHIPPED | OUTPATIENT
Start: 2023-09-27

## 2023-10-30 ENCOUNTER — HOSPITAL ENCOUNTER (OUTPATIENT)
Dept: MRI IMAGING | Facility: CLINIC | Age: 23
Discharge: HOME | End: 2023-10-30
Attending: PSYCHIATRY & NEUROLOGY | Admitting: PSYCHIATRY & NEUROLOGY
Payer: COMMERCIAL

## 2023-10-30 DIAGNOSIS — G43.109 MIGRAINE WITH AURA AND WITHOUT STATUS MIGRAINOSUS, NOT INTRACTABLE: ICD-10-CM

## 2023-10-30 PROCEDURE — A9585 GADOBUTROL INJECTION: HCPCS | Mod: JZ | Performed by: PSYCHIATRY & NEUROLOGY

## 2023-10-30 PROCEDURE — 255N000002 HC RX 255 OP 636: Mod: JZ | Performed by: PSYCHIATRY & NEUROLOGY

## 2023-10-30 PROCEDURE — 70553 MRI BRAIN STEM W/O & W/DYE: CPT | Mod: 26 | Performed by: RADIOLOGY

## 2023-10-30 PROCEDURE — 70553 MRI BRAIN STEM W/O & W/DYE: CPT

## 2023-10-30 RX ORDER — GADOBUTROL 604.72 MG/ML
7.5 INJECTION INTRAVENOUS ONCE
Status: COMPLETED | OUTPATIENT
Start: 2023-10-30 | End: 2023-10-30

## 2023-10-30 RX ADMIN — GADOBUTROL 5.5 ML: 604.72 INJECTION INTRAVENOUS at 14:43

## 2023-11-08 NOTE — RESULT ENCOUNTER NOTE
Reviewed this imaging and punctate focus does appear consistent with artifact and not consistent with actual pathology. No abnormal enhancement seen or findings contributing to headaches or other symptoms.

## 2023-11-21 ENCOUNTER — OFFICE VISIT (OUTPATIENT)
Dept: ALLERGY | Facility: CLINIC | Age: 23
End: 2023-11-21
Attending: OTOLARYNGOLOGY
Payer: COMMERCIAL

## 2023-11-21 ENCOUNTER — LAB (OUTPATIENT)
Dept: LAB | Facility: CLINIC | Age: 23
End: 2023-11-21
Payer: COMMERCIAL

## 2023-11-21 DIAGNOSIS — J30.2 SEASONAL AND PERENNIAL ALLERGIC RHINOCONJUNCTIVITIS: ICD-10-CM

## 2023-11-21 DIAGNOSIS — J03.90 TONSILLITIS: ICD-10-CM

## 2023-11-21 DIAGNOSIS — R09.82 POSTNASAL DRIP: ICD-10-CM

## 2023-11-21 DIAGNOSIS — Z91.018 FOOD ALLERGY: Primary | ICD-10-CM

## 2023-11-21 DIAGNOSIS — H10.10 SEASONAL AND PERENNIAL ALLERGIC RHINOCONJUNCTIVITIS: ICD-10-CM

## 2023-11-21 DIAGNOSIS — J30.89 SEASONAL AND PERENNIAL ALLERGIC RHINOCONJUNCTIVITIS: ICD-10-CM

## 2023-11-21 DIAGNOSIS — Z91.018 FOOD ALLERGY: ICD-10-CM

## 2023-11-21 DIAGNOSIS — J31.2 CHRONIC PHARYNGITIS: ICD-10-CM

## 2023-11-21 PROCEDURE — 95024 IQ TESTS W/ALLERGENIC XTRCS: CPT | Performed by: DERMATOLOGY

## 2023-11-21 PROCEDURE — 95004 PERQ TESTS W/ALRGNC XTRCS: CPT | Performed by: DERMATOLOGY

## 2023-11-21 PROCEDURE — 86003 ALLG SPEC IGE CRUDE XTRC EA: CPT | Performed by: DERMATOLOGY

## 2023-11-21 PROCEDURE — 83520 IMMUNOASSAY QUANT NOS NONAB: CPT | Performed by: DERMATOLOGY

## 2023-11-21 PROCEDURE — 36415 COLL VENOUS BLD VENIPUNCTURE: CPT | Performed by: PATHOLOGY

## 2023-11-21 PROCEDURE — 82785 ASSAY OF IGE: CPT | Performed by: DERMATOLOGY

## 2023-11-21 PROCEDURE — 99000 SPECIMEN HANDLING OFFICE-LAB: CPT | Performed by: PATHOLOGY

## 2023-11-21 PROCEDURE — 99204 OFFICE O/P NEW MOD 45 MIN: CPT | Mod: 25 | Performed by: DERMATOLOGY

## 2023-11-21 NOTE — PROGRESS NOTES
[facility] Dermato-allergology Note  Office visit  Encounter Date: Nov 21, 2023  ____________________________________________    CC: No chief complaint on file.      HPI:  (Nov 21, 2023)  Ms. Alpa Whitney is a(n) 23 year old female who presents today as new patient for allergy consultation    She got strep throat in January of this year. She was working at a  at the time and has a lot of exposure to sick kids in general. She was treated with a course of antibiotics, as well as an inhaler (which was given to her as a part of her congestion).   She got better after the first time, and then got sick again.     She has a known history of food allergies, she thinks to the following.   Watermelon, seasonings, black pepper, sometimes chicken, pineapple (?),     Symptoms include explosive diarrhea, abdominal pain, occasionally vomiting...  With pepper, she gets a tingling sensation and possibly swelling.     She uses cold medicine, but it doesn't help. Sometimes her throat hurts to the extent that she can't sleep.    - Otherwise feeling well in usual state of health    Physical exam:  General: In no acute distress, well-developed, well-nourished  Eyes: no conjunctivitis  ENT: no signs of rhinitis   Pulmonary: no wheezing or coughing  Skin: Focused examination of the skin on test sites was performed = see test results below    Past Medical History:   Patient Active Problem List   Diagnosis    Moderate single current episode of major depressive disorder (H)    Hallucinations, auditory and visual associated wtih mood disorder    FH: sudden cardiac death (SCD)    Nexplanon insertion    Fibromyalgia    Cervical cancer screening    Migraine with aura and without status migrainosus, not intractable     Past Medical History:   Diagnosis Date    Chronic rhinitis     Depressed     hospitalized for SI 7/2017    Depressive disorder     FH: sudden cardiac death (SCD)     Nexplanon insertion  09/13/2023    inserted today 9/13/2023    TBI (traumatic brain injury) (H) 06/17/2020       Allergies:  Allergies   Allergen Reactions    Cymbalta [Duloxetine Hcl] Dizziness    Coffee Bean Extract [Coffea Arabica] Hives       Medications:  Current Outpatient Medications   Medication    cetirizine (ZYRTEC) 10 MG tablet    ondansetron (ZOFRAN ODT) 4 MG ODT tab    potassium aminobenzoate 500 MG CAPS capsule    pregabalin (LYRICA) 75 MG capsule    prochlorperazine (COMPAZINE) 10 MG tablet    rizatriptan (MAXALT-MLT) 10 MG ODT    SUMAtriptan (IMITREX) 50 MG tablet    topiramate (TOPAMAX) 25 MG tablet    vitamin D2 (ERGOCALCIFEROL) 35799 units (1250 mcg) capsule     No current facility-administered medications for this visit.       Social History:    Family History:  Family History   Problem Relation Age of Onset    Diabetes Paternal Grandfather     Family History Negative Mother     Family History Negative Father     No Known Problems Brother     No Known Problems Sister     Diabetes Maternal Grandmother     Glaucoma No family hx of     Macular Degeneration No family hx of     Migraines Father        Previous Labs, Allergy Tests, Dermatopathology, Imaging:  none    Referred By: Greg Singh MD  [address]  [place] [address]     Allergy Tests:    Past Allergy Test    Order for Future Allergy Testing:    [x] Outpatient  [] Inpatient: [facility]..../ Bed ....       Skin Atopy (atopic dermatitis) [] Yes   [x] No .........  Contact allergies:   [x] Yes   [] No .......... (Metals like nickel)  Hand eczema:   [] Yes   [x] No           Leading hand:   [] R   [] L       [] Ambidextrous         Drug allergies:        [x] Yes   [] No  which?......     Urticaria/Angioedema  [x] Yes   [] No .........  Food Allergy:  [x] Yes   [] No  which?.coffee and others see above  Pets :  [] Yes   [x] No  which?...... dog and three cats at home        [x]  Rhinitis   [] Conjunctivitis   [x] Sinusitis   [] Polyposis   [x]  Otitis   [x] Pharyngitis         [x]  Postnasal drip    []  none  Operations:   [] Tonsils   [] Septum   [] Sinus   [] Polyposis        [] Asthma bronchiale   [] Coughing      [x]  None (family history)   Symptoms (mostly Rhinoconjunctivitis and Asthma) aggravated by:  Season   [x] I   [x] II   [] III   [] IV   []V   []VI   []VII   []VIII   []IX   []X   [x]XI   [x]XII     [] perennial   Day time      [x] morning   [] noon      [] evening        [] night    [] whole day........  []  none  Location/changes    [] inside        [x] outside   [] mountains    [] sea     [] others.............   []  none  Triggers, specific     [] animals     [] plants     [x] dust              [] others ...........................    []  none  Triggers, others       [] work          [] psyche    [] sport            [] others .............................  []  none  Irritant                [] phys efforts [x] smoke    [] heat/cold     [] odors  []others............... []  none    Order for PATCH TESTS  Reason for tests (suspected allergy): not necessary  Known previous allergies: n/a  Standardized panels  [] Standard panel (40 tests)  [] Preservatives & Antimicrobials (31 tests)  [] Emulsifiers & Additives (25 tests)   [] Perfumes/Flavours & Plants (25 tests)  [] Hairdresser panel (12 tests)  [] Rubber Chemicals (22 tests)  [] Plastics (26 tests)  [] Colorants/Dyes/Food additives (20 tests)  [] Metals (implants/dental) (24 tests)  [] Local anaesthetics/NSAIDs (13 tests)  [] Antibiotics & Antimycotics (14 tests)   [] Corticosteroids (15 tests)   [] Photopatch test (62 tests)   [] others: ...      [] Patient's own products: ...  DO NOT test if chemical or biological identity is unknown!   always ask from patient the product information and safety sheets (MSDS)       Order for PRICK TESTS    Reason for tests (suspected allergy): perennial RS, PND and pharyngitis and food allergies  Known previous allergies: none    Standardized prick  panels  [x] Atopic panel (20 tests)  [] Pediatric Panel (12 tests)  [] Milk, Meat, Eggs, Grains (20 tests)   [] Dust, Epithelia, Feathers (10 tests)  [] Fish, Seafood, Shellfish (17 tests)  [] Nuts, Beans (14 tests)  [] Spice, Vegetable, Fruit (17 tests)  [] Pollen Panel = Tree, Grass, Weed (24 tests)  [] Others: ...      [x] Patient's own products: coffee  DO NOT test if chemical or biological identity is unknown!   always ask from patient the product information and safety sheets (MSDS)     Standardized intradermal tests  [] Penicillium notatum [] Aspergillus fumigatus [] House dust mites D.far & D. pteron  [] Cat    [] dog  [] Others: ...  [] Bee venom   [] Wasp venom  !!Specific protocol with dilutions!!       Order for Drug allergy tests (prick & Intradermal &  patch tests)    [] Penicillin G  [] Ampicillin [] Cefazolin   [] Ceftriaxone   [] Ceftazidime  [] Bactrim    [] Others: ...  Order for ... as test date    Atopy Screen (Placed Nov 21, 2023)  No Substance Readings (15 min) Evaluation   POS Histamine 1mg/ml ++    NEG NaCl 0.9% -      No Substance Readings (15 min) Evaluation   1 Alternaria alternata (tenuis)  -    2 Cladosporium herbarum -    3 Aspergillus fumigatus -    4 Penicillium notatum -    5 Dermatophagoides pteronyssinus -    6 Dermatophagoides farinae -    7 Dog epithelium (canis spp) -    8 Cat hair (rachelle catus) -    9 Cockroach   (Blatella americana & germanica) -    10 Grass mix midwest   (Ambreen, Orchard, Redtop, Pantera) -    11 Colton grass (sorghum halepense) -    12 Weed mix   (common Cocklebur, Lamb s quarters, rough redroot Pigweed, Dock/Sorrel) -    13 Mug wort (artemisia vulgare) -    14 Ragweed giant/short (ambrosia spp) -    15 White birch (Betula papyrifera) -    16 Tree mix 1 (Pecan, Maple BHR, Oak RVW, american [place], black [place]) -    17 Red cedar (juniperus virginia) -    18 Tree mix 2   (white Brayan, river/red Birch, black [place], common [place], american Elm) -    19 Box  elder/Maple mix (acer spp) -    20 [place] shagbark (carya ovata) -      Chicken from food panel      Rasing canes chicken prick/prick           Conclusion       Intradermal Testing (Placed Nov 21, 2023)  No Substance Conc.  Reading (15min)  immediate Papule [mm] / Erythema [mm] Reading   (... days)  delayed Papule [mm] / Erythema [mm] Remarks   DF Standard Dust Mite - D. Farinae 1:10 -   -    DP Standard Dust Mite - D. Pteronyssinus 1:10 -   -    A Aspergillus fumigatus  1:10 -   -    P Penicillium notatum 1:10 -   -     Alternaria alt 1:10 -   -     Dog epithelium 1:10 -   -     Cat epithelium         Conclusions: no immediate type reaction. Patient will observe if delayed type reaction     ________________________________    Assessment & Plan:    ==> Final Diagnosis:     # Chronic pharyngitis and Tonsillitis ==> in skin tests no signs for specific allergies  Ddx infectious and/or allergic  * chronic illness with exacerbation, progression, side effects from treatment    # suspicion for atopic predisposition? 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    ==> Treatment Plan:  >> see if any delayed type reaction to IDT and take it from there  >> if all negative, unlikely allergic    Procedures Performed: Allergy tests, including prick, intradermal tests     Staff:  Provider    Follow-up in Derm-Allergy clinic if any delayed reaction    I spent a total of 35 minutes with Alpa Whitney. This time  was spent counseling the patient and/or coordinating care, explaining the allergy tests, performing allergy tests and assessing the clinical relevance.

## 2023-11-21 NOTE — LETTER
11/21/2023         RE: Alpa Whitney  [address]  [facility] [address]        Dear Colleague,    Thank you for referring your patient, Alpa Whitney, to the [facility] [place]. Please see a copy of my visit note below.    [facility] Dermato-allergology Note  Office visit  Encounter Date: Nov 21, 2023  ____________________________________________    CC: No chief complaint on file.      HPI:  (Nov 21, 2023)  Ms. Alpa Whitney is a(n) 23 year old female who presents today as new patient for allergy consultation    She got strep throat in January of this year. She was working at a  at the time and has a lot of exposure to sick kids in general. She was treated with a course of antibiotics, as well as an inhaler (which was given to her as a part of her congestion).   She got better after the first time, and then got sick again.     She has a known history of food allergies, she thinks to the following.   Watermelon, seasonings, black pepper, sometimes chicken, pineapple (?),     Symptoms include explosive diarrhea, abdominal pain, occasionally vomiting...  With pepper, she gets a tingling sensation and possibly swelling.     She uses cold medicine, but it doesn't help. Sometimes her throat hurts to the extent that she can't sleep.    - Otherwise feeling well in usual state of health    Physical exam:  General: In no acute distress, well-developed, well-nourished  Eyes: no conjunctivitis  ENT: no signs of rhinitis   Pulmonary: no wheezing or coughing  Skin: Focused examination of the skin on test sites was performed = see test results below  {Skin Exam:[id number]}    Past Medical History:   Patient Active Problem List   Diagnosis     Moderate single current episode of major depressive disorder (H)     Hallucinations, auditory and visual associated wtih mood disorder     FH: sudden cardiac death (SCD)     Nexplanon insertion      Fibromyalgia     Cervical cancer screening     Migraine with aura and without status migrainosus, not intractable     Past Medical History:   Diagnosis Date     Chronic rhinitis      Depressed     hospitalized for SI 7/2017     Depressive disorder      FH: sudden cardiac death (SCD)      Nexplanon insertion 09/13/2023    inserted today 9/13/2023     TBI (traumatic brain injury) (H) 06/17/2020       Allergies:  Allergies   Allergen Reactions     Cymbalta [Duloxetine Hcl] Dizziness     Coffee Bean Extract [Coffea Arabica] Hives       Medications:  Current Outpatient Medications   Medication     cetirizine (ZYRTEC) 10 MG tablet     ondansetron (ZOFRAN ODT) 4 MG ODT tab     potassium aminobenzoate 500 MG CAPS capsule     pregabalin (LYRICA) 75 MG capsule     prochlorperazine (COMPAZINE) 10 MG tablet     rizatriptan (MAXALT-MLT) 10 MG ODT     SUMAtriptan (IMITREX) 50 MG tablet     topiramate (TOPAMAX) 25 MG tablet     vitamin D2 (ERGOCALCIFEROL) 27803 units (1250 mcg) capsule     No current facility-administered medications for this visit.       Social History:  The patient {works:[id number]}. Patient has the following hobbies or non-occupational exposure ***    Family History:  Family History   Problem Relation Age of Onset     Diabetes Paternal Grandfather      Family History Negative Mother      Family History Negative Father      No Known Problems Brother      No Known Problems Sister      Diabetes Maternal Grandmother      Glaucoma No family hx of      Macular Degeneration No family hx of      Migraines Father        Previous Labs, Allergy Tests, Dermatopathology, Imaging:  none    Referred By: Greg Singh MD  [address] [facility]  [place] [address]     Allergy Tests:    Past Allergy Test    Order for Future Allergy Testing:    [x] Outpatient  [] Inpatient: [facility]..../ Bed ....       Skin Atopy (atopic dermatitis) [] Yes   [x] No .........  Contact allergies:   [x] Yes   [] No .......... (Metals like  nickel)  Hand eczema:   [] Yes   [x] No           Leading hand:   [] R   [] L       [] Ambidextrous         Drug allergies:        [x] Yes   [] No  which?......     Urticaria/Angioedema  [x] Yes   [] No .........  Food Allergy:  [x] Yes   [] No  which?.coffee and others see above  Pets :  [] Yes   [x] No  which?...... dog and three cats at home        [x]  Rhinitis   [] Conjunctivitis   [x] Sinusitis   [] Polyposis   [x] Otitis   [x] Pharyngitis         [x]  Postnasal drip    []  none  Operations:   [] Tonsils   [] Septum   [] Sinus   [] Polyposis        [] Asthma bronchiale   [] Coughing      [x]  None (family history)   Symptoms (mostly Rhinoconjunctivitis and Asthma) aggravated by:  Season   [x] I   [x] II   [] III   [] IV   []V   []VI   []VII   []VIII   []IX   []X   [x]XI   [x]XII     [] perennial   Day time      [x] morning   [] noon      [] evening        [] night    [] whole day........  []  none  Location/changes    [] inside        [x] outside   [] mountains    [] sea     [] others.............   []  none  Triggers, specific     [] animals     [] plants     [x] dust              [] others ...........................    []  none  Triggers, others       [] work          [] psyche    [] sport            [] others .............................  []  none  Irritant                [] phys efforts [x] smoke    [] heat/cold     [] odors  []others............... []  none    Order for PATCH TESTS  Reason for tests (suspected allergy): not necessary  Known previous allergies: n/a  Standardized panels  [] Standard panel (40 tests)  [] Preservatives & Antimicrobials (31 tests)  [] Emulsifiers & Additives (25 tests)   [] Perfumes/Flavours & Plants (25 tests)  [] Hairdresser panel (12 tests)  [] Rubber Chemicals (22 tests)  [] Plastics (26 tests)  [] Colorants/Dyes/Food additives (20 tests)  [] Metals (implants/dental) (24 tests)  [] Local anaesthetics/NSAIDs (13 tests)  [] Antibiotics & Antimycotics (14 tests)   []  Corticosteroids (15 tests)   [] Photopatch test (62 tests)   [] others: ...      [] Patient's own products: ...  DO NOT test if chemical or biological identity is unknown!   always ask from patient the product information and safety sheets (MSDS)       Order for PRICK TESTS    Reason for tests (suspected allergy): perennial RS, PND and pharyngitis and food allergies  Known previous allergies: none    Standardized prick panels  [x] Atopic panel (20 tests)  [] Pediatric Panel (12 tests)  [] Milk, Meat, Eggs, Grains (20 tests)   [] Dust, Epithelia, Feathers (10 tests)  [] Fish, Seafood, Shellfish (17 tests)  [] Nuts, Beans (14 tests)  [] Spice, Vegetable, Fruit (17 tests)  [] Pollen Panel = Tree, Grass, Weed (24 tests)  [] Others: ...      [x] Patient's own products: coffee  DO NOT test if chemical or biological identity is unknown!   always ask from patient the product information and safety sheets (MSDS)     Standardized intradermal tests  [] Penicillium notatum [] Aspergillus fumigatus [] House dust mites D.far & D. pteron  [] Cat    [] dog  [] Others: ...  [] Bee venom   [] Wasp venom  !!Specific protocol with dilutions!!       Order for Drug allergy tests (prick & Intradermal &  patch tests)    [] Penicillin G  [] Ampicillin [] Cefazolin   [] Ceftriaxone   [] Ceftazidime  [] Bactrim    [] Others: ...  Order for ... as test date    Atopy Screen (Placed Nov 21, 2023)  No Substance Readings (15 min) Evaluation   POS Histamine 1mg/ml ++    NEG NaCl 0.9% -      No Substance Readings (15 min) Evaluation   1 Alternaria alternata (tenuis)  -    2 Cladosporium herbarum -    3 Aspergillus fumigatus -    4 Penicillium notatum -    5 Dermatophagoides pteronyssinus -    6 Dermatophagoides farinae -    7 Dog epithelium (canis spp) -    8 Cat hair (rachelle catus) -    9 Cockroach   (Blatella americana & germanica) -    10 Grass mix midwest   (Ambreen, Orchard, Redtop, Pantera) -    11 [place] grass (sorghum halepense) -    12 Weed  mix   (common Cocklebur, Lamb s quarters, rough redroot Pigweed, Dock/Sorrel) -    13 Mug wort (artemisia vulgare) -    14 Ragweed giant/short (ambrosia spp) -    15 White birch (Betula papyrifera) -    16 Tree mix 1 (Pecan, Maple BHR, Oak RVW, american [place], black [place]) -    17 Red cedar (juniperus [place]) -    18 Tree mix 2   (white [place]ray[place], river/red Birch, black [place], common [place], american Elm) -    19 Box elder/Maple mix (acer spp) -    20 [place] shagbark (carya ovata) -      Chicken from food panel      Rasing canes chicken prick/prick           Conclusion       Intradermal Testing (Placed Nov 21, 2023)  No Substance Conc.  Reading (15min)  immediate Papule [mm] / Erythema [mm] Reading   (... days)  delayed Papule [mm] / Erythema [mm] Remarks   DF Standard Dust Mite - D. Farinae 1:10 -   -    DP Standard Dust Mite - D. Pteronyssinus 1:10 -   -    A Aspergillus fumigatus  1:10 -   -    P Penicillium notatum 1:10 -   -     Alternaria alt 1:10 -   -     Dog epithelium 1:10 -   -     Cat epithelium         Conclusions: no immediate type reaction. Patient will observe if delayed type reaction     ________________________________    Assessment & Plan:    ==> Final Diagnosis:     # Chronic pharyngitis and Tonsillitis ==> in skin tests no signs for specific allergies  Ddx infectious and/or allergic  * chronic illness with exacerbation, progression, side effects from treatment    # suspicion for atopic predisposition? 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    ==> Treatment Plan:  >> see if any delayed type reaction to IDT and take it from there  >> if all negative, unlikely allergic    Procedures Performed: Allergy tests, including prick, intradermal tests     Staff:  Provider    Follow-up in Derm-Allergy clinic if any delayed reaction    I spent a total of 35 minutes with Alpa Whitney. This time was spent counseling the patient and/or coordinating care, explaining the allergy tests, performing allergy tests and assessing the clinical relevance.         Again, thank you for allowing me to participate in the care of your patient.        Sincerely,        Gaurav Ulloa MD

## 2023-11-21 NOTE — NURSING NOTE
Chief Complaint   Patient presents with    Allergy Consult     [facility] is here today for allergy consult. Ref from ENT for J03.90 (ICD-10-CM) - Tonsillitis. Per pt, had an initial tonsilitis then never fully goes away.     Nirali Woodard RN

## 2023-11-27 LAB
CHICKEN MEAT IGE QN: <0.1 KU(A)/L
COFFEE IGE QN: <0.1 KU(A)/L
IGE SERPL-ACNC: 10 KU/L (ref 0–114)

## 2023-11-28 LAB — TRYPTASE SERPL-MCNC: 2.7 UG/L

## 2023-12-13 ENCOUNTER — PRE VISIT (OUTPATIENT)
Dept: ALLERGY | Facility: CLINIC | Age: 23
End: 2023-12-13

## 2023-12-20 ENCOUNTER — TELEPHONE (OUTPATIENT)
Dept: NEUROLOGY | Facility: CLINIC | Age: 23
End: 2023-12-20
Payer: COMMERCIAL

## 2024-02-03 ENCOUNTER — HEALTH MAINTENANCE LETTER (OUTPATIENT)
Age: 24
End: 2024-02-03

## 2024-04-22 NOTE — PROGRESS NOTES
Assessment & Plan     Nausea and vomiting, unspecified vomiting type  - ondansetron (ZOFRAN ODT) 4 MG ODT tab  Dispense: 20 tablet; Refill: 0     Patient Instructions   Take zofran every 8 hours as needed for nausea  Small sips of fluids often.    May take imodium as needed for diarrhea  Try the BRAT diet.    Follow up if the diarrhea persists or worsens in the Urgency room or ER for IV fluids.          No follow-ups on file.    ZAHIRA Calderón CNP  [facility] Bunn is a 46 year old adult who presents to clinic today for the following health issues:  Chief Complaint   Patient presents with    Urgent Care     Ate [employer] yesterday- 4-5 hours later- vomiting and diarrhea- still experiencing nausea today- experiencing sharp joint pain- concerns of dehydration     HPI    Gastro    Onset of symptoms was 1 day(s) ago.  Course of illness is same.    Severity moderately severe  Current and Associated symptoms:  cramping, vomiting, and diarrhea  Aggravating factors: eating.    Alleviating factors:nothing  Diarrhea: Yes  6 stools/day and is persisting  Stools: watery  Vomitting: Yes  5 times/day and is persisting  Appetite: decreased  Risk factors: possible bad food exposure      Review of Systems  Constitutional, HEENT, cardiovascular, pulmonary, gi and gu systems are negative, except as otherwise noted.      Objective    /87   Pulse (!) 128   Temp (!) 101.4  F (38.6  C) (Tympanic)   Wt 97.5 kg (215 lb)   SpO2 96%   Physical Exam   GENERAL: alert and no distress  NECK: no adenopathy, no asymmetry, masses, or scars  RESP: lungs clear to auscultation - no rales, rhonchi or wheezes  CV: Tachycardic.  regular rate and rhythm, normal S1 S2, no S3 or S4, no murmur, click or rub, no peripheral edema  ABDOMEN: soft, nontender, without hepatosplenomegaly or masses, bowel sounds hyperactive and no palpable or pulsatile masses  MS: no gross musculoskeletal defects noted, no  Phone call with patient's mother ([phone]) who agreed to pickup patient for discharge at 12:00pm today.   edema

## 2024-05-13 ENCOUNTER — PATIENT OUTREACH (OUTPATIENT)
Dept: CARE COORDINATION | Facility: CLINIC | Age: 24
End: 2024-05-13
Payer: COMMERCIAL

## 2025-03-02 ENCOUNTER — HEALTH MAINTENANCE LETTER (OUTPATIENT)
Age: 25
End: 2025-03-02